# Patient Record
Sex: MALE | Race: WHITE | Employment: FULL TIME
[De-identification: names, ages, dates, MRNs, and addresses within clinical notes are randomized per-mention and may not be internally consistent; named-entity substitution may affect disease eponyms.]

---

## 2017-01-03 ENCOUNTER — SURGERY (OUTPATIENT)
Age: 57
End: 2017-01-03

## 2017-01-03 ENCOUNTER — ANESTHESIA EVENT (OUTPATIENT)
Dept: ENDOSCOPY | Age: 57
End: 2017-01-03
Payer: COMMERCIAL

## 2017-01-03 ENCOUNTER — ANESTHESIA (OUTPATIENT)
Dept: ENDOSCOPY | Age: 57
End: 2017-01-03
Payer: COMMERCIAL

## 2017-01-03 PROCEDURE — 74011250636 HC RX REV CODE- 250/636

## 2017-01-03 PROCEDURE — 74011000250 HC RX REV CODE- 250

## 2017-01-03 RX ORDER — LIDOCAINE HYDROCHLORIDE 20 MG/ML
INJECTION, SOLUTION EPIDURAL; INFILTRATION; INTRACAUDAL; PERINEURAL AS NEEDED
Status: DISCONTINUED | OUTPATIENT
Start: 2017-01-03 | End: 2017-01-03 | Stop reason: HOSPADM

## 2017-01-03 RX ORDER — PROPOFOL 10 MG/ML
INJECTION, EMULSION INTRAVENOUS AS NEEDED
Status: DISCONTINUED | OUTPATIENT
Start: 2017-01-03 | End: 2017-01-03 | Stop reason: HOSPADM

## 2017-01-03 RX ADMIN — PROPOFOL 200 MG: 10 INJECTION, EMULSION INTRAVENOUS at 09:46

## 2017-01-03 RX ADMIN — LIDOCAINE HYDROCHLORIDE 100 MG: 20 INJECTION, SOLUTION EPIDURAL; INFILTRATION; INTRACAUDAL; PERINEURAL at 09:40

## 2017-01-03 NOTE — ANESTHESIA PREPROCEDURE EVALUATION
Anesthetic History   No history of anesthetic complications            Review of Systems / Medical History  Patient summary reviewed, nursing notes reviewed and pertinent labs reviewed    Pulmonary          Smoker (3/4 ppd)         Neuro/Psych   Within defined limits           Cardiovascular    Hypertension              Exercise tolerance: >4 METS  Comments: No EKG on file   GI/Hepatic/Renal       Hepatitis (Dx 7-2016): type C    Liver disease    Comments: Esophageal varices Endo/Other        Cancer (  rectal CA) and anemia     Other Findings   Comments: H/o ETOH         Physical Exam    Airway  Mallampati: I  TM Distance: 4 - 6 cm  Neck ROM: normal range of motion   Mouth opening: Normal     Cardiovascular  Regular rate and rhythm,  S1 and S2 normal,  no murmur, click, rub, or gallop  Rhythm: regular  Rate: normal      Pertinent negatives: No murmur   Dental         Pulmonary  Breath sounds clear to auscultation               Abdominal  GI exam deferred       Other Findings            Anesthetic Plan    ASA: 2  Anesthesia type: general          Induction: Intravenous  Anesthetic plan and risks discussed with: Patient

## 2017-01-03 NOTE — ANESTHESIA POSTPROCEDURE EVALUATION
Post-Anesthesia Evaluation and Assessment    Patient: Kate Gaxiola MRN: [id number]  SSN: [id number]    YOB: 1960  Age: 55 y.o. Sex: male       Cardiovascular Function/Vital Signs  Visit Vitals    /79    Pulse 68    Temp 36.6 °C (97.8 °F)    Resp 15    Ht 5' 10\" (1.778 m)    Wt 93.9 kg (207 lb)    SpO2 99%    BMI 29.7 kg/m2       Patient is status post total IV anesthesia anesthesia for Procedure(s):  ESOPHAGOGASTRODUODENOSCOPY (EGD). Nausea/Vomiting: None    Postoperative hydration reviewed and adequate. Pain:  Pain Scale 1: Numeric (0 - 10) (01/03/17 1014)  Pain Intensity 1: 0 (01/03/17 1014)   Managed    Neurological Status: At baseline    Mental Status and Level of Consciousness: Arousable    Pulmonary Status:   O2 Device: Room air (01/03/17 1014)   Adequate oxygenation and airway patent    Complications related to anesthesia: None    Post-anesthesia assessment completed.  No concerns    Signed By: Anand Vogt DO     January 3, 2017

## 2017-01-13 ENCOUNTER — OFFICE VISIT (OUTPATIENT)
Dept: HEMATOLOGY | Age: 57
End: 2017-01-13

## 2017-01-13 VITALS
HEART RATE: 82 BPM | WEIGHT: 206 LBS | DIASTOLIC BLOOD PRESSURE: 83 MMHG | SYSTOLIC BLOOD PRESSURE: 122 MMHG | BODY MASS INDEX: 29.49 KG/M2 | TEMPERATURE: 96.5 F | OXYGEN SATURATION: 99 % | HEIGHT: 70 IN

## 2017-01-13 DIAGNOSIS — B18.2 CHRONIC HEPATITIS C WITHOUT HEPATIC COMA (HCC): Primary | ICD-10-CM

## 2017-01-13 NOTE — MR AVS SNAPSHOT
Visit Information Date & Time Provider Department Dept. Phone Encounter #  
 1/13/2017 12:30 PM Shun Garcia MD [facility] [address] [phone] [id number] Follow-up Instructions Return for FS with NP. Upcoming Health Maintenance Date Due Pneumococcal 19-64 Highest Risk (1 of 3 - PCV13) 11/10/1980 DTaP/Tdap/Td series (1 - Tdap) 11/10/1982 FOBT Q 1 YEAR AGE 50-75 11/10/2011 INFLUENZA AGE 9 TO ADULT 8/1/2016 Allergies as of 1/13/2017  Review Complete On: 1/13/2017 By: Shun Garcia MD  
 No Known Allergies Current Immunizations  Reviewed on 8/9/2010 No immunizations on file. Not reviewed this visit You Were Diagnosed With   
  
 Codes Comments Chronic hepatitis C without hepatic coma (HCC)    -  Primary ICD-10-CM: B18.2 ICD-9-CM: 070.54 Vitals BP Pulse Temp Height(growth percentile) Weight(growth percentile) SpO2  
 122/83 82 96.5 °F (35.8 °C) (Oral) 5' 10\" (1.778 m) 206 lb (93.4 kg) 99% BMI Smoking Status 29.56 kg/m2 Current Every Day Smoker Vitals History BMI and BSA Data Body Mass Index Body Surface Area  
 29.56 kg/m 2 2.15 m 2 Preferred Pharmacy Pharmacy Name Phone [address] [facility] [phone] Your Updated Medication List  
  
   
This list is accurate as of: 1/13/17  1:29 PM.  Always use your most recent med list.  
  
  
  
  
 ALPRAZolam 0.25 mg tablet Commonly known as:  Helen Curio Take 0.25 mg by mouth two (2) times daily as needed for Anxiety. amLODIPine 5 mg tablet Commonly known as:  Radha Darting Take 5 mg by mouth daily. lisinopril 40 mg tablet Commonly known as:  Marilynne Shows Take 40 mg by mouth daily. multivitamin tablet Commonly known as:  ONE A DAY Take 1 Tab by mouth daily. omeprazole 20 mg capsule Commonly known as:  PRILOSEC  
 20 mg twice a day x 14 days then daily x 6 months  
  
 thiamine 100 mg tablet Commonly known as:  B-1 Take 1 Tab by mouth daily. We Performed the Following AFP WITH AFP-L3% [[id number] Custom] CBC WITH AUTOMATED DIFF [65202 CPT(R)] FERRITIN [08336 CPT(R)] HCV RNA BY COLLETTE QL,RFLX TO QT [73679 CPT(R)] HEP A AB, TOTAL F5216318 CPT(R)] HEP B SURFACE AB [id number] CPT(R)] HEP C GENOTYPE D6085544 CPT(R)] HEPATIC FUNCTION PANEL [15736 CPT(R)] HEPATITIS B CORE AB, TOTAL [id number] CPT(R)] IRON PROFILE V6884899 CPT(R)] METABOLIC PANEL, BASIC [72537 CPT(R)] PROTHROMBIN TIME + INR [28433 CPT(R)] Follow-up Instructions Return for FS with NP. To-Do List   
 01/13/2017 Imaging:  US ABD COMP Introducing [facility]! Dear Caridad Gonzales: Thank you for requesting a [employer] account. Our records indicate that you already have an active StarCard account. You can access your account anytime at [email or web address] Did you know that you can access your hospital and ER discharge instructions at any time in StarCard? You can also review all of your test results from your hospital stay or ER visit. Additional Information If you have questions, please visit the Frequently Asked Questions section of the StarCard website at [email or web address] [employer]/. Remember, StarCard is NOT to be used for urgent needs. For medical emergencies, dial 911. Now available from your iPhone and Android! Please provide this summary of care documentation to your next provider. Your primary care clinician is listed as Omer Hood. Makenzie Magana. If you have any questions after today's visit, please call [phone].

## 2017-01-13 NOTE — PROGRESS NOTES
[address] [facility] MD Christi, MAMIE Painting PA-C Valentine Dies, MD, FACP, MD Simran Hensleyo, NP     Sven Sánchez NP        [address]     [address] [place] [address], [address] [address]     [phone]     FAX: [address] [place] [address], [address] [place] [address], [address] [phone]     FAX: [phone]       Patient Care Team:  Isidro Hughes DO as PCP - General (Family Practice)  Eladia Shanks MD (Gastroenterology)      Problem List  Date Reviewed: 1/13/2017          Codes Class Noted    Secondary esophageal varices with bleeding [facility]) ICD-10-CM: I85.11  ICD-9-CM: 456.20  7/12/2016        Tobacco abuse ICD-10-CM: Z72.0  ICD-9-CM: 305.1  7/12/2016        Hypertension ICD-10-CM: I10  ICD-9-CM: 401.9  7/12/2016        Anal cancer ([facility] ICD-10-CM: C21.0  ICD-9-CM: 154.3  8/11/2010    Overview Signed 4/26/2012  4:00 PM by Pablo Clements MD     5-2010--xrt and chemotx--Dr. Palmer De La Rosa             Essential hypertension, benign ICD-10-CM: I10  ICD-9-CM: 401.1  7/6/2010        Cigarette smoker ICD-10-CM: C79.397  ICD-9-CM: 305.1  7/6/2010        Chronic hepatitis C without hepatic coma [facility]) ICD-10-CM: B18.2  ICD-9-CM: 070.54  7/6/2010                The physicians listed above have asked me to see Eric Mercado in consultation regarding chronic HCV and its management. All medical records sent by the referring physicians were reviewed including imaging studies     The patient is a 55 y.o.  male who found out he had HCV when he developed variceal bleeding with melena in 7/2016. Risk factors for acquiring HCV are inhaling cocaine in 1980s. There was no history of acute incteric hepatitis at the time of these risk factors. Ultrasound of the liver was performed in 7/2016.   The results of the imaging suggested chronic liver disease. An assessment of liver fibrosis with biopsy or elastography has not been performed. The patient has never received treatment for chronic HCV. The most recent laboratory studies indicate that the liver transaminases are elevated, alkaline phosphatase is normal, tests of hepatic synthetic and metabolic function are normal, and the platelet count is depressed. The patient has not developed ascites. The patient has not developed edema. The patient has not developed hepatic encephalopathy. The patient has had prior variceal bleeding. Varices have been treated with band ligation. The patient has no symptoms which can be attributed to the liver disorder. The patient has not experienced fatigue, pain in the right side over the liver, problems concentrating, swelling of the abdomen, swelling of the lower extremities, hematemesis, hematochezia. The patient completes all daily activities without any functional limitations. ALLERGIES  No Known Allergies    MEDICATIONS  Current Outpatient Prescriptions   Medication Sig    amLODIPine (NORVASC) 5 mg tablet Take 5 mg by mouth daily.  lisinopril (PRINIVIL, ZESTRIL) 40 mg tablet Take 40 mg by mouth daily.  omeprazole (PRILOSEC) 20 mg capsule 20 mg twice a day x 14 days then daily x 6 months    thiamine (B-1) 100 mg tablet Take 1 Tab by mouth daily.  ALPRAZolam (XANAX) 0.25 mg tablet Take 0.25 mg by mouth two (2) times daily as needed for Anxiety.  multivitamin (ONE A DAY) tablet Take 1 Tab by mouth daily. No current facility-administered medications for this visit. SYSTEM REVIEW NOT RELATED TO LIVER DISEASE OR REVIEWED ABOVE:  Constitution systems: Negative for fever, chills, weight gain, weight loss. Eyes: Negative for visual changes. ENT: Negative for sore throat, painful swallowing. Respiratory: Negative for cough, hemoptysis, SOB.    Cardiology: Negative for chest pain, palpitations. GI:  Negative for constipation or diarrhea. : Negative for urinary frequency, dysuria, hematuria, nocturia. Skin: Negative for rash. Hematology: Negative for easy bruising, blood clots. Musculo-skelatal: Negative for back pain, muscle pain, weakness. Neurologic: Negative for headaches, dizziness, vertigo, memory problems not related to HE. Psychology: Negative for anxiety, depression. FAMILY HISTORY:  The father  of MI. The mother is alive and healthy. There is no family history of liver disease. SOCIAL HISTORY:  The patient is . The spouse has been tested for HCV and is negative. The patient has 1 child and 1 grandchildren. The patient currently smokes half pack of tobacco daily. The patient has previously consumed alcohol socially never in excess. The patient has been abstinent from alcohol since 2016. The patient currently works full time as a .        PHYSICAL EXAMINATION:  Visit Vitals    /83    Pulse 82    Temp 96.5 °F (35.8 °C) (Oral)    Ht 5' 10\" (1.778 m)    Wt 206 lb (93.4 kg)    SpO2 99%    BMI 29.56 kg/m2     General: No acute distress. Eyes: Sclera anicteric. ENT: No oral lesions. Thyroid normal.  Nodes: No adenopathy. Skin: No spider angiomata. No jaundice. No palmar erythema. Respiratory: Lungs clear to auscultation. Cardiovascular: Regular heart rate. No murmurs. No JVD. Abdomen: Soft non-tender. Liver size normal to percussion/palpation. Spleen not palpable. No obvious ascites. Extremities: No edema. No muscle wasting. No gross arthritic changes. Neurologic: Alert and oriented. Cranial nerves grossly intact. No asterixis.     LABORATORY STUDIES:  [facility] [place] [facility] [address] [place] [address] & Units 2017   WBC 3.4 - 10.8 x10E3/uL 6.2 4.5   ANC 1.4 - 7.0 x10E3/uL 3.2 2.4   HGB 12.6 - 17.7 g/dL 12.3 (L) 8.7 (L)    - 379 x10E3/uL 140 (L) 69 (L)   INR 0.8 - 1. 2 1.1    AST 0 - 40 IU/L 78 (H)    ALT 0 - 44 IU/L 51 (H)    Alk Phos 39 - 117 IU/L 111    Bili, Total 0.0 - 1.2 mg/dL 0.5    Bili, Direct 0.00 - 0.40 mg/dL 0.24    Albumin 3.5 - 5.5 g/dL 4.2    BUN 6 - 24 mg/dL 17 13   Creat 0.76 - 1.27 mg/dL 0.76 0.85   Na 134 - 144 mmol/L 143 142   K 3.5 - 5.2 mmol/L 4.6 3.6   Cl 96 - 106 mmol/L 102 111 (H)   CO2 18 - 29 mmol/L 23 20 (L)   Glucose 65 - 99 mg/dL 93 108 (H)   Magnesium 1.6 - 2.4 mg/dL  1.8     SEROLOGIES:  Serologies Latest Ref Rng & Units 1/13/2017 7/12/2016   Hep A Ab, Total Negative Positive (A)    Hep B Surface Ag Index  <0.10   Hep B Surface Ag Interp NEG    NEGATIVE   Hep B Core Ab, Total Negative Negative    Hep B Surface AB QL  Non Reactive    Hep C Ab NR    REACTIVE (A)   Ferritin 30 - 400 ng/mL 20 (L) 190   Iron % Saturation 15 - 55 % 7 (LL) 21     LIVER HISTOLOGY:  Not available or performed    ENDOSCOPIC PROCEDURES:  7/2016. EGD by Dr Evangelina Mcclellan. Esophagela varices. Banding performed. 11/2016. EGD by Dr Evangelina Mcclellan. Esophagela varices. Banding performed. 1/2017. EGD by Dr Evangelina Mcclellan. Scars of prior banding. No varices. RADIOLOGY:  7/2016. Ultrasound of liver. Echogenic consistent with chronic liver disease. No liver mass lesions. No dilated bile ducts. No ascites. OTHER TESTING:  Not available or performed    ASSESSMENT AND PLAN:  Chronic HCV with cirrhosis. Have performed laboratory testing to monitor liver function and degree of liver injury. This included BMP, hepatic panel, CBC with platelet count, INR. Liver function is normal.  The platelet count is depressed. Will perform and/or review results of HCV viral load and HCV genotype to define the specific treatment and duration of treatment that will be required. Will perform serologic and virologic studies to assess for other causes of chronic liver disease. There is no reason to perform liver biopsy at this time.       The Fibroscan can assess liver fibrosis and determine if a patient has advanced fibrosis or cirrhosis without the need for liver biopsy. The Fibroscan is currently available at [facility]. This will be performed at the next office visit. The patient has not previously been treated for HCV. Discussed the treatment alternatives. The SVR/cure rate for HCV now exceeds 90% with just oral anti-viral therapy and no interferon injections or significant side effects for most patients with HCV. The specific treatment is dependent upon genotype, viral load and histology. The patient was directed to continue all current medications at the current dosages. There are no contraindications for the patient to take any medications that are necessary for treatment of other medical issues. The patient was counseled regarding alcohol consumption. Vaccination for viral hepatitis B is recommended since the patient has no serologic evidence of previous exposure or vaccination with immunity. Vaccination for viral hepatitis A is not needed. The patient has serologic evidence of prior exposure or vaccination with immunity. [address] screening will be performed prior to the next office visit. AFP was ordered today and ultrasound will be scheduled. 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 No treatment necessary. Anemia is secondary to low iron stores. The ferritin and iron saturation are now. Will start oral FE for 3 months and monitor HB and repeat FE panel and ferritin in 3 months. Esophageal varicies with prior bleeding. Varices have been banded to obliteration. The next EGD to assess for varices will be performed in 1/2018. All of the above issues were discussed with the patient. All questions were answered. The patient expressed a clear understanding of the above. [facility] 87 in 4 weeks for Fibroscan, and to initiate HCV treatment.     Lakshmi Robertson MD  [facility] [place] [facility] [address]  [phone]

## 2017-01-14 LAB
ALBUMIN SERPL-MCNC: 4.2 G/DL (ref 3.5–5.5)
ALP SERPL-CCNC: 111 IU/L (ref 39–117)
ALT SERPL-CCNC: 51 IU/L (ref 0–44)
AST SERPL-CCNC: 78 IU/L (ref 0–40)
BASOPHILS # BLD AUTO: 0.1 X10E3/UL (ref 0–0.2)
BASOPHILS NFR BLD AUTO: 1 %
BILIRUB DIRECT SERPL-MCNC: 0.24 MG/DL (ref 0–0.4)
BILIRUB SERPL-MCNC: 0.5 MG/DL (ref 0–1.2)
BUN SERPL-MCNC: 17 MG/DL (ref 6–24)
BUN/CREAT SERPL: 22 (ref 9–20)
CALCIUM SERPL-MCNC: 9.3 MG/DL (ref 8.7–10.2)
CHLORIDE SERPL-SCNC: 102 MMOL/L (ref 96–106)
CO2 SERPL-SCNC: 23 MMOL/L (ref 18–29)
CREAT SERPL-MCNC: 0.76 MG/DL (ref 0.76–1.27)
EOSINOPHIL # BLD AUTO: 0.2 X10E3/UL (ref 0–0.4)
EOSINOPHIL NFR BLD AUTO: 2 %
ERYTHROCYTE [DISTWIDTH] IN BLOOD BY AUTOMATED COUNT: 15.3 % (ref 12.3–15.4)
FERRITIN SERPL-MCNC: 20 NG/ML (ref 30–400)
GLUCOSE SERPL-MCNC: 93 MG/DL (ref 65–99)
HAV AB SER QL IA: POSITIVE
HBV CORE AB SERPL QL IA: NEGATIVE
HBV SURFACE AB SER QL: NON REACTIVE
HCT VFR BLD AUTO: 36.6 % (ref 37.5–51)
HGB BLD-MCNC: 12.3 G/DL (ref 12.6–17.7)
IMM GRANULOCYTES # BLD: 0 X10E3/UL (ref 0–0.1)
IMM GRANULOCYTES NFR BLD: 0 %
INR PPP: 1.1 (ref 0.8–1.2)
IRON SATN MFR SERPL: 7 % (ref 15–55)
IRON SERPL-MCNC: 36 UG/DL (ref 38–169)
LYMPHOCYTES # BLD AUTO: 2.1 X10E3/UL (ref 0.7–3.1)
LYMPHOCYTES NFR BLD AUTO: 34 %
MCH RBC QN AUTO: 27.7 PG (ref 26.6–33)
MCHC RBC AUTO-ENTMCNC: 33.6 G/DL (ref 31.5–35.7)
MCV RBC AUTO: 82 FL (ref 79–97)
MONOCYTES # BLD AUTO: 0.7 X10E3/UL (ref 0.1–0.9)
MONOCYTES NFR BLD AUTO: 10 %
NEUTROPHILS # BLD AUTO: 3.2 X10E3/UL (ref 1.4–7)
NEUTROPHILS NFR BLD AUTO: 53 %
PLATELET # BLD AUTO: 140 X10E3/UL (ref 150–379)
POTASSIUM SERPL-SCNC: 4.6 MMOL/L (ref 3.5–5.2)
PROT SERPL-MCNC: 8 G/DL (ref 6–8.5)
PROTHROMBIN TIME: 11.5 SEC (ref 9.1–12)
RBC # BLD AUTO: 4.44 X10E6/UL (ref 4.14–5.8)
SODIUM SERPL-SCNC: 143 MMOL/L (ref 134–144)
TIBC SERPL-MCNC: 530 UG/DL (ref 250–450)
UIBC SERPL-MCNC: 494 UG/DL (ref 111–343)
WBC # BLD AUTO: 6.2 X10E3/UL (ref 3.4–10.8)

## 2017-01-15 RX ORDER — LANOLIN ALCOHOL/MO/W.PET/CERES
325 CREAM (GRAM) TOPICAL
Qty: 270 TAB | Refills: 3 | Status: SHIPPED | OUTPATIENT
Start: 2017-01-15 | End: 2017-01-24 | Stop reason: SDUPTHER

## 2017-01-16 LAB
AFP L3 MFR SERPL: 7.8 % (ref 0–9.9)
AFP SERPL-MCNC: 11.3 NG/ML (ref 0–8)
HCV RNA SERPL NAA+PROBE-ACNC: NORMAL IU/ML
HCV RNA SERPL NAA+PROBE-LOG IU: 6.24 LOG10 IU/ML
HCV RNA SERPL QL NAA+PROBE: POSITIVE
TEST INFORMATION: NORMAL

## 2017-01-18 LAB
HCV GENTYP SERPL NAA+PROBE: NORMAL
PLEASE NOTE, 550474: NORMAL

## 2017-01-24 DIAGNOSIS — D50.9 IRON DEFICIENCY ANEMIA, UNSPECIFIED IRON DEFICIENCY ANEMIA TYPE: Primary | ICD-10-CM

## 2017-01-24 RX ORDER — LANOLIN ALCOHOL/MO/W.PET/CERES
325 CREAM (GRAM) TOPICAL
Qty: 270 TAB | Refills: 3 | Status: SHIPPED | OUTPATIENT
Start: 2017-01-24 | End: 2017-09-06 | Stop reason: ALTCHOICE

## 2017-02-11 ENCOUNTER — HOSPITAL ENCOUNTER (OUTPATIENT)
Dept: ULTRASOUND IMAGING | Age: 57
Discharge: HOME | End: 2017-02-11
Attending: INTERNAL MEDICINE
Payer: COMMERCIAL

## 2017-02-11 DIAGNOSIS — B18.2 CHRONIC HEPATITIS C WITHOUT HEPATIC COMA (HCC): ICD-10-CM

## 2017-02-11 PROCEDURE — 76700 US EXAM ABDOM COMPLETE: CPT

## 2017-02-22 ENCOUNTER — OFFICE VISIT (OUTPATIENT)
Dept: HEMATOLOGY | Age: 57
End: 2017-02-22

## 2017-02-22 VITALS
WEIGHT: 212.13 LBS | TEMPERATURE: 98.9 F | HEIGHT: 70 IN | SYSTOLIC BLOOD PRESSURE: 129 MMHG | DIASTOLIC BLOOD PRESSURE: 81 MMHG | HEART RATE: 86 BPM | OXYGEN SATURATION: 97 % | BODY MASS INDEX: 30.37 KG/M2

## 2017-02-22 DIAGNOSIS — B18.2 CHRONIC HEPATITIS C WITHOUT HEPATIC COMA (HCC): Primary | ICD-10-CM

## 2017-02-22 RX ORDER — LEDIPASVIR AND SOFOSBUVIR 90; 400 MG/1; MG/1
1 TABLET, FILM COATED ORAL DAILY
Qty: 28 TAB | Refills: 2 | Status: SHIPPED | OUTPATIENT
Start: 2017-02-22 | End: 2017-05-17

## 2017-02-22 NOTE — PROGRESS NOTES
93 Dakota Barraza MD, Lisa Del Valle, TERE Grimes MD, FACP, MD Simran Garcia, MAMIE Christian NP        [address], [address] [facility], [address]     [phone]     FAX: 104 [address], [address] [place] [address], [address]     [phone]     FAX: [phone]       Patient Care Team:  Salvatore Fang DO as PCP - General (Family Practice)  Ignacio Quarles MD (Gastroenterology)      Problem List  Date Reviewed: 2/22/2017          Codes Class Noted    Secondary esophageal varices with bleeding [facility]) ICD-10-CM: I85.11  ICD-9-CM: 456.20  7/12/2016        Tobacco abuse ICD-10-CM: Z72.0  ICD-9-CM: 305.1  7/12/2016        Hypertension ICD-10-CM: I10  ICD-9-CM: 401.9  7/12/2016        Anal cancer ([facility] [address] ICD-10-CM: C21.0  ICD-9-CM: 154.3  8/11/2010    Overview Signed 4/26/2012  4:00 PM by Rosy Blackmon MD     5-2010--xrt and chemotx--Dr. Talia Srinivasan and García             Essential hypertension, benign ICD-10-CM: I10  ICD-9-CM: 401.1  7/6/2010        Cigarette smoker ICD-10-CM: V63.432  ICD-9-CM: 305.1  7/6/2010        Chronic hepatitis C without hepatic coma [facility]) ICD-10-CM: B18.2  ICD-9-CM: 070.54  7/6/2010              Michaela Grajeda returns to the [place] [address] today for education and management of chronic hepatitis C and to perform an in-office assessment of hepatic fibrosis by means of fibroscan analysis. The active problem list, all pertinent past medical history, medications, liver histology, endoscopic studies, radiologic findings and laboratory findings related to the liver disorder were reviewed with the patient. The patient is a 55 y.o.  male who found out he had HCV when he developed variceal bleeding with melena in 7/2016.       Risk factors for acquiring HCV are inhaling cocaine in 1980s. There was no history of acute incteric hepatitis at the time of these risk factors. Ultrasound of the liver was performed in 02/2017. The results of the imaging suggested chronic liver disease. No hepatic masses. An assessment of liver fibrosis with fibroscan analysis was performed during this visit. The results suggest bridging fibrosis/cirrhosis. The wide IQR and patient presentation also suggest a component of fatty liver. The patient has never received treatment for chronic HCV. The most recent laboratory studies indicate that the liver transaminases are elevated, alkaline phosphatase is normal, tests of hepatic synthetic and metabolic function are normal, and the platelet count is depressed. The patient has not developed ascites. The patient has not developed edema. The patient has not developed hepatic encephalopathy. The patient has had prior variceal bleeding. Varices have been treated with band ligation. The patient has no symptoms which can be attributed to the liver disorder. The patient completes all daily activities without any functional limitations. The patient has not experienced fatigue, fevers, chills, shortness of breath, chest pain, pain in the right side over the liver, diffuse abdominal pain, nausea, vomiting, constipation, diarrhrea, dry eyes, dry mouth, arthralgias, myalgias, yellowing of the eyes or skin, itching, dark urine, problems concentrating, swelling of the abdomen, swelling of the lower extremities, hematemesis, or hematochezia. ALLERGIES  No Known Allergies    MEDICATIONS  Current Outpatient Prescriptions   Medication Sig    ledipasvir-sofosbuvir (HARVONI)  mg tab Take 1 Tab by mouth daily for 84 days. Indications: CHRONIC HEPATITIS C - GENOTYPE 1, GT 1A. Cirrhotic. Txmt naive. [facility] pt.     ferrous sulfate 325 mg (65 mg iron) tablet Take 1 Tab by mouth three (3) times daily (with meals).  amLODIPine (NORVASC) 5 mg tablet Take 5 mg by mouth daily.  lisinopril (PRINIVIL, ZESTRIL) 40 mg tablet Take 40 mg by mouth daily.  omeprazole (PRILOSEC) 20 mg capsule 20 mg twice a day x 14 days then daily x 6 months    thiamine (B-1) 100 mg tablet Take 1 Tab by mouth daily.  multivitamin (ONE A DAY) tablet Take 1 Tab by mouth daily. No current facility-administered medications for this visit. SYSTEM REVIEW NOT RELATED TO LIVER DISEASE OR REVIEWED ABOVE:  Constitution systems: Negative for fever, chills, weight gain, weight loss. Eyes: Negative for visual changes. ENT: Negative for sore throat, painful swallowing. Respiratory: Negative for cough, hemoptysis, SOB. Cardiology: Negative for chest pain, palpitations. GI:  Negative for constipation or diarrhea. : Negative for urinary frequency, dysuria, hematuria, nocturia. Skin: Negative for rash. Hematology: Negative for easy bruising, blood clots. Musculo-skelatal: Negative for back pain, muscle pain, weakness. Neurologic: Negative for headaches, dizziness, vertigo, memory problems not related to HE. Psychology: Negative for anxiety, depression. FAMILY HISTORY:  The father  of MI. The mother is alive and healthy. There is no family history of liver disease. SOCIAL HISTORY:  The patient is . The spouse has been tested for HCV and is negative. The patient has 1 child and 1 grandchildren. The patient currently smokes half pack of tobacco daily. The patient has previously consumed alcohol socially never in excess. The patient has been abstinent from alcohol since 2016. The patient currently works full time as a .        PHYSICAL EXAMINATION:  Visit Vitals    /81    Pulse 86    Temp 98.9 °F (37.2 °C) (Tympanic)    Ht 5' 10\" (1.778 m)    Wt 212 lb 2 oz (96.2 kg)    SpO2 97%    BMI 30.44 kg/m2     General: No acute distress. Eyes: Sclera anicteric. ENT: No oral lesions. Thyroid normal.  Nodes: No adenopathy. Skin: No spider angiomata. No jaundice. No palmar erythema. Respiratory: Lungs clear to auscultation. Cardiovascular: Regular heart rate. No murmurs. No JVD. Abdomen: Soft non-tender. Liver size normal to percussion/palpation. Spleen not palpable. No obvious ascites. Extremities: No edema. No muscle wasting. No gross arthritic changes. Neurologic: Alert and oriented. Cranial nerves grossly intact. No asterixis. LABORATORY STUDIES:  Liver [place] [address] & Units 1/13/2017 7/14/2016   WBC 3.4 - 10.8 x10E3/uL 6.2 4.5   ANC 1.4 - 7.0 x10E3/uL 3.2 2.4   HGB 12.6 - 17.7 g/dL 12.3 (L) 8.7 (L)    - 379 x10E3/uL 140 (L) 69 (L)   INR 0.8 - 1.2 1.1    AST 0 - 40 IU/L 78 (H)    ALT 0 - 44 IU/L 51 (H)    Alk Phos 39 - 117 IU/L 111    Bili, Total 0.0 - 1.2 mg/dL 0.5    Bili, Direct 0.00 - 0.40 mg/dL 0.24    Albumin 3.5 - 5.5 g/dL 4.2    BUN 6 - 24 mg/dL 17 13   Creat 0.76 - 1.27 mg/dL 0.76 0.85   Na 134 - 144 mmol/L 143 142   K 3.5 - 5.2 mmol/L 4.6 3.6   Cl 96 - 106 mmol/L 102 111 (H)   CO2 18 - 29 mmol/L 23 20 (L)   Glucose 65 - 99 mg/dL 93 108 (H)   Magnesium 1.6 - 2.4 mg/dL  1.8     SEROLOGIES:  Serologies Latest Ref Rng & Units 1/13/2017 7/12/2016   Hep A Ab, Total Negative Positive (A)    Hep B Surface Ag Index  <0.10   Hep B Surface Ag Interp NEG    NEGATIVE   Hep B Core Ab, Total Negative Negative    Hep B Surface AB QL  Non Reactive    Hep C Ab NR    REACTIVE (A)   Ferritin 30 - 400 ng/mL 20 (L) 190   Iron % Saturation 15 - 55 % 7 (LL) 21     LIVER HISTOLOGY:  02/2017. FibroScan performed at [facility] of [address] [place] [address]. EkPa was 13.8. Suggested fibrosis level is F3/F4. The wide IQR of 2.2 also suggests fatty liver. ENDOSCOPIC PROCEDURES:  7/2016. EGD by Dr Rosa Tamez. Esophagela varices. Banding performed. 11/2016. EGD by Dr Rosa Tamez. Esophagela varices. Banding performed. 1/2017. EGD by Dr Rosa Tamez. Scars of prior banding. No varices. RADIOLOGY:  7/2016. Ultrasound of liver. Echogenic consistent with chronic liver disease. No liver mass lesions. No dilated bile ducts. No ascites. 02/2017. Ultrasound of the liver. Mildly echogenic heterogeneous liver with no focal abnormality detected. OTHER TESTING:  Not available or performed    ASSESSMENT AND PLAN:  Chronic HCV with cirrhosis. The most recent laboratory studies indicate that the liver transaminases are elevated, alkaline phosphatase is normal, tests of hepatic synthetic and metabolic function are normal, and the platelet count is depressed. Fibroscan analysis suggests advanced liver disease, bridging fibrosis/cirrhosis. Complications of cirrhosis were discussed in detail. We discussed thrombocytopenia, portal hypertension, varices, GI bleeding, peripheral edema, ascites, hepatic encephalopathy, and hepatocellular carcinoma. We discussed the need for follow ups on a regular basis, at 3 month intervals to monitor for complications. We discussed the need for every 6 month liver imaging studies. Serologic evaluation was negative for all causes of chronic liver disease. HCV. The patient has genotype 1A and is treatment naive. We discussed treatment options. One treatment regime is Harvoni, a combination pill of 400 mg sofosbuvir and 90 mg ledipasvir. The treatment regime is one tablet daily for 12 weeks. He would like to be treated with this regime. This was ordered through [facility] in [place]    I explained to him that I ordered the HCV treatment medications through our specialty pharmacy and that the medications will be shipped to his home. He will receive 3 separate shipments of 28 tabs per shipment. He may begin taking the treatment medications as soon as they arrive. He is to call and make an appointment for treatment week #4 once he begins therapy. He voiced understanding of this plan.      The patient was directed to continue all current medications at the current dosages. There are no contraindications for the patient to take any medications that are necessary for treatment of other medical issues. The patient currently take omeprazole 20 mg/day. He states that he will stop this medication next week. I explained that it is fine to take the omeprazole as long as he either takes it with the Harvoni or 12 hours apart from it and does not increase the dose of the PPI above 20 mg/day. The patient was instructed not to take any antacids within 4 hours of taking the Harvoni. The patient verbalized understanding of these instructions. The patient was counseled regarding alcohol consumption. Vaccination for viral hepatitis B is recommended since the patient has no serologic evidence of previous exposure or vaccination with immunity. Vaccination for viral hepatitis A is not needed. The patient has serologic evidence of prior exposure or vaccination with immunity. [address] screening will be performed prior to the next office visit. AFP was ordered today and ultrasound will be scheduled. 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 No treatment necessary. Anemia is secondary to low iron stores. The ferritin and iron saturation are now. Will started oral FE for 3 months and will monitor HB and repeat FE panel and ferritin in 3 months. Esophageal varicies with prior bleeding. Varices have been banded to obliteration. The next EGD to assess for varices will be performed in 1/2018. All of the above issues were discussed with the patient. All questions were answered. The patient expressed a clear understanding of the above. 30 minutes total time spent with this patient with more than 50% of this time spent counseling and coordinating care as described above. Kelin in 3 months.   He will make a treatment week 4 appointment if he begins HCV treatment before then.       Kat Dey, ANNAMARIAP-C   Liver [facility]/ [facility], [address] [facility], [address] [facility]  [phone]

## 2017-04-19 ENCOUNTER — OFFICE VISIT (OUTPATIENT)
Dept: HEMATOLOGY | Age: 57
End: 2017-04-19

## 2017-04-19 VITALS
BODY MASS INDEX: 30.96 KG/M2 | HEART RATE: 80 BPM | DIASTOLIC BLOOD PRESSURE: 80 MMHG | OXYGEN SATURATION: 97 % | SYSTOLIC BLOOD PRESSURE: 124 MMHG | WEIGHT: 215.8 LBS | TEMPERATURE: 98.3 F

## 2017-04-19 DIAGNOSIS — B18.2 CHRONIC HEPATITIS C WITHOUT HEPATIC COMA (HCC): Primary | ICD-10-CM

## 2017-04-19 NOTE — MR AVS SNAPSHOT
Visit Information Date & Time Provider Department Dept. Phone Encounter #  
 4/19/2017  2:00 PM Sarah López NP [facility] [id number] Follow-up Instructions Return in about 20 weeks (around 9/6/2017). Your Appointments 9/6/2017  1:30 PM  
Follow Up with Holly Brock NP [facility] [address] ([facility]) Appt Note: Follow up [address] [facility] [phone] [facility] [address]  
[address] [facility] [address] [address] Upcoming Health Maintenance Date Due Pneumococcal 19-64 Highest Risk (1 of 3 - PCV13) 11/10/1980 DTaP/Tdap/Td series (1 - Tdap) 11/10/1982 FOBT Q 1 YEAR AGE 50-75 11/10/2011 INFLUENZA AGE 9 TO ADULT 8/1/2016 Allergies as of 4/19/2017  Review Complete On: 4/19/2017 By: Alicia Bosch No Known Allergies Current Immunizations  Reviewed on 8/9/2010 No immunizations on file. Not reviewed this visit You Were Diagnosed With   
  
 Codes Comments Chronic hepatitis C without hepatic coma (HCC)    -  Primary ICD-10-CM: B18.2 ICD-9-CM: 070.54 Vitals BP Pulse Temp Weight(growth percentile) SpO2 BMI  
 124/80 80 98.3 °F (36.8 °C) (Tympanic) 215 lb 12.8 oz (97.9 kg) 97% 30.96 kg/m2 Smoking Status Current Every Day Smoker BMI and BSA Data Body Mass Index Body Surface Area 30.96 kg/m 2 2.2 m 2 Preferred Pharmacy Pharmacy Name Phone Cecy Jones @ [address] [phone] Your Updated Medication List  
  
   
This list is accurate as of: 4/19/17  2:27 PM.  Always use your most recent med list. amLODIPine 5 mg tablet Commonly known as:  Sharion [place] Take 5 mg by mouth daily. ferrous sulfate 325 mg (65 mg iron) tablet Take 1 Tab by mouth three (3) times daily (with meals). ledipasvir-sofosbuvir  mg Tab Commonly known as:  Elton Rm Take 1 Tab by mouth daily for 84 days. Indications: CHRONIC HEPATITIS C - GENOTYPE 1, GT 1A. Cirrhotic. Txmt naive. [facility] pt.  
  
 lisinopril 40 mg tablet Commonly known as:  Stephen Masmerrick Take 40 mg by mouth daily. multivitamin tablet Commonly known as:  ONE A DAY Take 1 Tab by mouth daily. omeprazole 20 mg capsule Commonly known as:  PRILOSEC  
20 mg twice a day x 14 days then daily x 6 months  
  
 thiamine 100 mg tablet Commonly known as:  B-1 Take 1 Tab by mouth daily. We Performed the Following AFP WITH AFP-L3% [[id number] Custom] CBC WITH AUTOMATED DIFF [89290 CPT(R)] FERRITIN [14638 CPT(R)] HCV, QT WITH GRAPH [id number] CPT(R)] HEPATIC FUNCTION PANEL [42495 CPT(R)] IRON PROFILE [id number] CPT(R)] METABOLIC PANEL, BASIC [13242 CPT(R)] PROTHROMBIN TIME + INR [98802 CPT(R)] Follow-up Instructions Return in about 20 weeks (around 9/6/2017). Introducing [facility]! Dear Ruth Zuñiga: Thank you for requesting a [employer] account. Our records indicate that you already have an active [employer] account. You can access your account anytime at [email or web address] Did you know that you can access your hospital and ER discharge instructions at any time in [employer]? You can also review all of your test results from your hospital stay or ER visit. Additional Information If you have questions, please visit the Frequently Asked Questions section of the [employer] website at [email or web address] Remember, [employer] is NOT to be used for urgent needs. For medical emergencies, dial 911. Now available from your iPhone and Android! Please provide this summary of care documentation to your next provider. Your primary care clinician is listed as Sierra Montes. Mega Medel.  If you have any questions after today's visit, please call [phone].

## 2017-04-19 NOTE — PROGRESS NOTES
[facility], MAMIE Rosenberg PA-C Clotilde Caprio, MD, FACP, MD Simran Beverly NP Fain Sayers, NP        [address] [place] [address]     [address] [place] [address], [address] [address]     [phone]     FAX: [address], [address] [facility], [address]     [phone]     FAX: [phone]       Patient Care Team:  Macarena Perez DO as PCP - General (Family Practice)  Derick Reyez MD (Gastroenterology)      Problem List  Date Reviewed: 4/19/2017          Codes Class Noted    Secondary esophageal varices with bleeding [facility]) ICD-10-CM: I85.11  ICD-9-CM: 456.20  7/12/2016        Tobacco abuse ICD-10-CM: Z72.0  ICD-9-CM: 305.1  7/12/2016        Hypertension ICD-10-CM: I10  ICD-9-CM: 401.9  7/12/2016        Anal cancer ([facility] ICD-10-CM: C21.0  ICD-9-CM: 154.3  8/11/2010    Overview Signed 4/26/2012  4:00 PM by Kg Duong MD     5-2010--xrt and chemotx--Dr. Lady Robert             Essential hypertension, benign ICD-10-CM: I10  ICD-9-CM: 401.1  7/6/2010        Cigarette smoker ICD-10-CM: Y56.165  ICD-9-CM: 305.1  7/6/2010        Chronic hepatitis C without hepatic coma [facility]) ICD-10-CM: B18.2  ICD-9-CM: 070.54  7/6/2010              Casi Stallworth returns to the [facility] [employer] today for education and management of cirrhosis and chronic hepatitis C. The patient began HCV treatment on 03/18/2017 with once daily Harvoni. He will be treated for 12 weeks total.   The HCV has not been treated previously. The active problem list, all pertinent past medical history, medications, liver histology, endoscopic studies, radiologic findings and laboratory findings related to the liver disorder were reviewed with the patient. The patient is a 55 y.o.   male who found out he had HCV when he developed variceal bleeding with melena in 7/2016. Risk factors for acquiring HCV are inhaling cocaine in 1980s. There was no history of acute incteric hepatitis at the time of these risk factors. Ultrasound of the liver was performed in 02/2017. The results of the imaging suggested chronic liver disease. No hepatic masses. An assessment of liver fibrosis with fibroscan analysis was performed during this visit. The results suggest bridging fibrosis/cirrhosis. The wide IQR and patient presentation also suggest a component of fatty liver. The most recent laboratory studies indicate that the liver transaminases are elevated, alkaline phosphatase is normal, tests of hepatic synthetic and metabolic function are normal, and the platelet count is depressed. The patient has not developed ascites. The patient has not developed edema. The patient has not developed hepatic encephalopathy. The patient has had prior variceal bleeding. Varices have been treated with band ligation. The patient has no symptoms which can be attributed to the liver disorder. The patient completes all daily activities without any functional limitations. The patient has not experienced fatigue, fevers, chills, shortness of breath, chest pain, pain in the right side over the liver, diffuse abdominal pain, nausea, vomiting, constipation, diarrhrea, dry eyes, dry mouth, arthralgias, myalgias, yellowing of the eyes or skin, itching, dark urine, problems concentrating, swelling of the abdomen, swelling of the lower extremities, hematemesis, or hematochezia. ALLERGIES  No Known Allergies    MEDICATIONS  Current Outpatient Prescriptions   Medication Sig    ledipasvir-sofosbuvir (HARVONI)  mg tab Take 1 Tab by mouth daily for 84 days. Indications: CHRONIC HEPATITIS C - GENOTYPE 1, GT 1A. Cirrhotic. Txmt naive. [facility] pt.     ferrous sulfate 325 mg (65 mg iron) tablet Take 1 Tab by mouth three (3) times daily (with meals).  amLODIPine (NORVASC) 5 mg tablet Take 5 mg by mouth daily.  lisinopril (PRINIVIL, ZESTRIL) 40 mg tablet Take 40 mg by mouth daily.  thiamine (B-1) 100 mg tablet Take 1 Tab by mouth daily.  multivitamin (ONE A DAY) tablet Take 1 Tab by mouth daily.  omeprazole (PRILOSEC) 20 mg capsule 20 mg twice a day x 14 days then daily x 6 months     No current facility-administered medications for this visit. SYSTEM REVIEW NOT RELATED TO LIVER DISEASE OR REVIEWED ABOVE:  Constitution systems: Negative for fever, chills, weight gain, weight loss. Eyes: Negative for visual changes. ENT: Negative for sore throat, painful swallowing. Respiratory: Negative for cough, hemoptysis, SOB. Cardiology: Negative for chest pain, palpitations. GI:  Negative for constipation or diarrhea. : Negative for urinary frequency, dysuria, hematuria, nocturia. Skin: Negative for rash. Hematology: Negative for easy bruising, blood clots. Musculo-skelatal: Negative for back pain, muscle pain, weakness. Neurologic: Negative for headaches, dizziness, vertigo, memory problems not related to HE. Psychology: Negative for anxiety, depression. FAMILY HISTORY:  The father  of [place]. The mother is alive and healthy. There is no family history of liver disease. SOCIAL HISTORY:  The patient is . The spouse has been tested for HCV and is negative. The patient has 1 child and 1 grandchildren. The patient currently smokes half pack of tobacco daily. The patient has previously consumed alcohol socially never in excess. The patient has been abstinent from alcohol since 2016. The patient currently works full time as a .        PHYSICAL EXAMINATION:  Visit Vitals    /80    Pulse 80    Temp 98.3 °F (36.8 °C) (Tympanic)    Wt 215 lb 12.8 oz (97.9 kg)    SpO2 97%    BMI 30.96 kg/m2     General: No acute distress. Eyes: Sclera anicteric.    ENT: No oral lesions. Thyroid normal.  Nodes: No adenopathy. Skin: No spider angiomata. No jaundice. No palmar erythema. Respiratory: Lungs clear to auscultation. Cardiovascular: Regular heart rate. No murmurs. No JVD. Abdomen: Soft non-tender. Liver size normal to percussion/palpation. Spleen not palpable. No obvious ascites. Extremities: No edema. No muscle wasting. No gross arthritic changes. Neurologic: Alert and oriented. Cranial nerves grossly intact. No asterixis. LABORATORY STUDIES:  Liver [place] of [address] [place] [address] & Units 1/13/2017 7/14/2016   WBC 3.4 - 10.8 x10E3/uL 6.2 4.5   ANC 1.4 - 7.0 x10E3/uL 3.2 2.4   HGB 12.6 - 17.7 g/dL 12.3 (L) 8.7 (L)    - 379 x10E3/uL 140 (L) 69 (L)   INR 0.8 - 1.2 1.1    AST 0 - 40 IU/L 78 (H)    ALT 0 - 44 IU/L 51 (H)    Alk Phos 39 - 117 IU/L 111    Bili, Total 0.0 - 1.2 mg/dL 0.5    Bili, Direct 0.00 - 0.40 mg/dL 0.24    Albumin 3.5 - 5.5 g/dL 4.2    BUN 6 - 24 mg/dL 17 13   Creat 0.76 - 1.27 mg/dL 0.76 0.85   Na 134 - 144 mmol/L 143 142   K 3.5 - 5.2 mmol/L 4.6 3.6   Cl 96 - 106 mmol/L 102 111 (H)   CO2 18 - 29 mmol/L 23 20 (L)   Glucose 65 - 99 mg/dL 93 108 (H)   Magnesium 1.6 - 2.4 mg/dL  1.8     SEROLOGIES:  Serologies Latest Ref Rng & Units 1/13/2017 7/12/2016   Hep A Ab, Total Negative Positive (A)    Hep B Surface Ag Index  <0.10   Hep B Surface Ag Interp NEG    NEGATIVE   Hep B Core Ab, Total Negative Negative    Hep B Surface AB QL  Non Reactive    Hep C Ab NR    REACTIVE (A)   Ferritin 30 - 400 ng/mL 20 (L) 190   Iron % Saturation 15 - 55 % 7 (LL) 21     LIVER HISTOLOGY:  02/2017. FibroScan performed at [address]. EkPa was 13.8. Suggested fibrosis level is F3/F4. The wide IQR of 2.2 also suggests fatty liver. ENDOSCOPIC PROCEDURES:  7/2016. EGD by Dr Mindy Posey. Esophagela varices. Banding performed. 11/2016. EGD by Dr Mindy Posey. Esophagela varices. Banding performed. 1/2017. EGD by Dr Mindy Posey.   Scars of prior banding. No varices. RADIOLOGY:  7/2016. Ultrasound of liver. Echogenic consistent with chronic liver disease. No liver mass lesions. No dilated bile ducts. No ascites. 02/2017. Ultrasound of the liver. Mildly echogenic heterogeneous liver with no focal abnormality detected. OTHER TESTING:  Not available or performed    ASSESSMENT AND PLAN:  Chronic HCV with cirrhosis. The most recent laboratory studies indicate that the liver transaminases are elevated, alkaline phosphatase is normal, tests of hepatic synthetic and metabolic function are normal, and the platelet count is depressed. Will perform laboratory testing to monitor liver function and degree of liver injury. This will include hepatic panel, a CBC w/ diff, a PT/INR, an AFP-L3%, a BMP, a ferritin and iron panel, and HCV RNA. Complications of cirrhosis were discussed in detail. We discussed thrombocytopenia, portal hypertension, varices, GI bleeding, peripheral edema, ascites, hepatic encephalopathy, and hepatocellular carcinoma. We discussed the need for follow ups on a regular basis, at 3 month intervals to monitor for complications. We discussed the need for every 6 month liver imaging studies. Serologic evaluation was negative for all causes of chronic liver disease. HCV. The patient has genotype 1A. The patient began HCV treatment on 03/18/2017 with once daily Harvoni. He will be treated for 12 weeks total.   The HCV has not been treated previously. He has no treatment related complaints. The patient was directed to continue all current medications at the current dosages. There are no contraindications for the patient to take any medications that are necessary for treatment of other medical issues. The patient stopped taking omeprazole before he began the HCV treatment. He has no complaints of reflux or heartburn.    I explained that it is fine to take the omeprazole as long as he either takes it with the Harvoni or 12 hours apart from it and does not increase the dose of the PPI above 20 mg/day. The patient was instructed not to take any antacids within 4 hours of taking the Harvoni. The patient verbalized understanding of these instructions. The patient was counseled regarding alcohol consumption. Vaccination for viral hepatitis B is recommended since the patient has no serologic evidence of previous exposure or vaccination with immunity. Vaccination for viral hepatitis A is not needed. The patient has serologic evidence of prior exposure or vaccination with immunity. [facility] [facility] screening was recently performed and does not suggest emerging [address] Repeat imaging in 08/2017. 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 No treatment necessary. Anemia is secondary to low iron stores. He is on iron supplementation. Esophageal varicies with prior bleeding. Varices have been banded to obliteration. The next EGD to assess for varices will be performed in 1/2018. All of the above issues were discussed with the patient. All questions were answered. The patient expressed a clear understanding of the above. [facility] in 20 weeks to assess for SVR 12.     Gokul Bazan, FNP-C   Liver [place] [facility]/ [address], [address], [place], [address] [place] [address]  [phone]

## 2017-04-20 LAB
AFP L3 MFR SERPL: 7 % (ref 0–9.9)
AFP SERPL-MCNC: 7.5 NG/ML (ref 0–8)
ALBUMIN SERPL-MCNC: 4.3 G/DL (ref 3.5–5.5)
ALP SERPL-CCNC: 80 IU/L (ref 39–117)
ALT SERPL-CCNC: 18 IU/L (ref 0–44)
AST SERPL-CCNC: 24 IU/L (ref 0–40)
BASOPHILS # BLD AUTO: 0.1 X10E3/UL (ref 0–0.2)
BASOPHILS NFR BLD AUTO: 1 %
BILIRUB DIRECT SERPL-MCNC: 0.18 MG/DL (ref 0–0.4)
BILIRUB SERPL-MCNC: 0.6 MG/DL (ref 0–1.2)
BUN SERPL-MCNC: 15 MG/DL (ref 6–24)
BUN/CREAT SERPL: 21 (ref 9–20)
CALCIUM SERPL-MCNC: 9.3 MG/DL (ref 8.7–10.2)
CHLORIDE SERPL-SCNC: 98 MMOL/L (ref 96–106)
CO2 SERPL-SCNC: 21 MMOL/L (ref 18–29)
CREAT SERPL-MCNC: 0.71 MG/DL (ref 0.76–1.27)
EOSINOPHIL # BLD AUTO: 0.2 X10E3/UL (ref 0–0.4)
EOSINOPHIL NFR BLD AUTO: 2 %
ERYTHROCYTE [DISTWIDTH] IN BLOOD BY AUTOMATED COUNT: 15 % (ref 12.3–15.4)
FERRITIN SERPL-MCNC: 32 NG/ML (ref 30–400)
GLUCOSE SERPL-MCNC: 88 MG/DL (ref 65–99)
HCT VFR BLD AUTO: 37.1 % (ref 37.5–51)
HCV RNA SERPL NAA+PROBE-ACNC: NORMAL IU/ML
HGB BLD-MCNC: 12.7 G/DL (ref 12.6–17.7)
IMM GRANULOCYTES # BLD: 0 X10E3/UL (ref 0–0.1)
IMM GRANULOCYTES NFR BLD: 0 %
INR PPP: 1.1 (ref 0.8–1.2)
IRON SATN MFR SERPL: 32 % (ref 15–55)
IRON SERPL-MCNC: 146 UG/DL (ref 38–169)
LYMPHOCYTES # BLD AUTO: 2.2 X10E3/UL (ref 0.7–3.1)
LYMPHOCYTES NFR BLD AUTO: 34 %
MCH RBC QN AUTO: 30 PG (ref 26.6–33)
MCHC RBC AUTO-ENTMCNC: 34.2 G/DL (ref 31.5–35.7)
MCV RBC AUTO: 88 FL (ref 79–97)
MONOCYTES # BLD AUTO: 0.5 X10E3/UL (ref 0.1–0.9)
MONOCYTES NFR BLD AUTO: 7 %
NEUTROPHILS # BLD AUTO: 3.7 X10E3/UL (ref 1.4–7)
NEUTROPHILS NFR BLD AUTO: 56 %
PLATELET # BLD AUTO: 127 X10E3/UL (ref 150–379)
POTASSIUM SERPL-SCNC: 5.2 MMOL/L (ref 3.5–5.2)
PROT SERPL-MCNC: 8.1 G/DL (ref 6–8.5)
PROTHROMBIN TIME: 11.8 SEC (ref 9.1–12)
RBC # BLD AUTO: 4.23 X10E6/UL (ref 4.14–5.8)
SODIUM SERPL-SCNC: 137 MMOL/L (ref 134–144)
TEST INFORMATION: NORMAL
TIBC SERPL-MCNC: 451 UG/DL (ref 250–450)
UIBC SERPL-MCNC: 305 UG/DL (ref 111–343)
WBC # BLD AUTO: 6.6 X10E3/UL (ref 3.4–10.8)

## 2017-04-26 ENCOUNTER — TELEPHONE (OUTPATIENT)
Dept: HEMATOLOGY | Age: 57
End: 2017-04-26

## 2017-09-06 ENCOUNTER — OFFICE VISIT (OUTPATIENT)
Dept: HEMATOLOGY | Age: 57
End: 2017-09-06

## 2017-09-06 VITALS
BODY MASS INDEX: 32.03 KG/M2 | HEART RATE: 90 BPM | OXYGEN SATURATION: 96 % | TEMPERATURE: 99 F | DIASTOLIC BLOOD PRESSURE: 87 MMHG | SYSTOLIC BLOOD PRESSURE: 142 MMHG | WEIGHT: 223.2 LBS

## 2017-09-06 DIAGNOSIS — I85.11 SECONDARY ESOPHAGEAL VARICES WITH BLEEDING (HCC): ICD-10-CM

## 2017-09-06 DIAGNOSIS — I10 ESSENTIAL HYPERTENSION, BENIGN: ICD-10-CM

## 2017-09-06 DIAGNOSIS — B18.2 CHRONIC HEPATITIS C WITHOUT HEPATIC COMA (HCC): Primary | ICD-10-CM

## 2017-09-06 DIAGNOSIS — K74.60 CIRRHOSIS OF LIVER WITHOUT ASCITES, UNSPECIFIED HEPATIC CIRRHOSIS TYPE (HCC): ICD-10-CM

## 2017-09-06 NOTE — MR AVS SNAPSHOT
Visit Information Date & Time Provider Department Dept. Phone Encounter #  
 9/6/2017  1:30 PM April VERONICA Santillan, [facility] 219 [id number] Follow-up Instructions Return in about 6 months (around 3/6/2018). Your Appointments 3/6/2018  3:00 PM  
Follow Up with Holly Cook NP [address] ([facility]) Appt Note: Follow up [facility] [phone] [facility] [address]  
[address] [facility] [address] Upcoming Health Maintenance Date Due Pneumococcal 19-64 Highest Risk (1 of 3 - PCV13) 11/10/1980 DTaP/Tdap/Td series (1 - Tdap) 11/10/1982 FOBT Q 1 YEAR AGE 50-75 11/10/2011 INFLUENZA AGE 9 TO ADULT 8/1/2017 Allergies as of 9/6/2017  Review Complete On: 9/6/2017 By: Holly Cook NP No Known Allergies Current Immunizations  Reviewed on 8/9/2010 No immunizations on file. Not reviewed this visit You Were Diagnosed With   
  
 Codes Comments Chronic hepatitis C without hepatic coma (HCC)    -  Primary ICD-10-CM: B18.2 ICD-9-CM: 070.54 Secondary esophageal varices with bleeding (HCC)     ICD-10-CM: I85.11 ICD-9-CM: 456.20 Essential hypertension, benign     ICD-10-CM: I10 
ICD-9-CM: 401.1 Cirrhosis of liver without ascites, unspecified hepatic cirrhosis type ([facility].)     ICD-10-CM: K74.60 ICD-9-CM: 571.5 Vitals BP Pulse Temp Weight(growth percentile) SpO2 BMI  
 142/87 90 99 °F (37.2 °C) (Tympanic) 223 lb 3.2 oz (101.2 kg) 96% 32.03 kg/m2 Smoking Status Current Every Day Smoker Vitals History BMI and BSA Data Body Mass Index Body Surface Area 32.03 kg/m 2 2.24 m 2 Preferred Pharmacy Pharmacy Name Phone Cecy Jones @ [address] [facility], [address] [place] [address] [phone] Your Updated Medication List  
  
   
 This list is accurate as of: 9/6/17  1:56 PM.  Always use your most recent med list. amLODIPine 5 mg tablet Commonly known as:  Griffin Landa Take 5 mg by mouth daily. lisinopril 40 mg tablet Commonly known as:  Tildon Asmita Take 40 mg by mouth daily. multivitamin tablet Commonly known as:  ONE A DAY Take 1 Tab by mouth daily. thiamine 100 mg tablet Commonly known as:  B-1 Take 1 Tab by mouth daily. We Performed the Following CBC W/O DIFF [55601 CPT(R)] FERRITIN [86273 CPT(R)] HCV RNA BY COLLETTE QL,RFLX TO QT [51226 CPT(R)] IRON PROFILE [id number] CPT(R)] METABOLIC PANEL, COMPREHENSIVE [98243 CPT(R)] Follow-up Instructions Return in about 6 months (around 3/6/2018). To-Do List   
 09/06/2017 Imaging:  [facility] [employer] Introducing [facility]! Dear Calderon Wade: Thank you for requesting a [employer] account. Our records indicate that you already have an active [employer] account. You can access your account anytime at [email or web address] Did you know that you can access your hospital and ER discharge instructions at any time in [employer]? You can also review all of your test results from your hospital stay or ER visit. Additional Information If you have questions, please visit the Frequently Asked Questions section of the [employer] website at [email or web address] Remember, [employer] is NOT to be used for urgent needs. For medical emergencies, dial 911. Now available from your iPhone and Android! Please provide this summary of care documentation to your next provider. Your primary care clinician is listed as Laura Ford. If you have any questions after today's visit, please call [phone].

## 2017-09-07 LAB
ALBUMIN SERPL-MCNC: 4.1 G/DL (ref 3.5–5.5)
ALBUMIN/GLOB SERPL: 1.1 {RATIO} (ref 1.2–2.2)
ALP SERPL-CCNC: 109 IU/L (ref 39–117)
ALT SERPL-CCNC: 41 IU/L (ref 0–44)
AST SERPL-CCNC: 62 IU/L (ref 0–40)
BILIRUB SERPL-MCNC: 0.8 MG/DL (ref 0–1.2)
BUN SERPL-MCNC: 15 MG/DL (ref 6–24)
BUN/CREAT SERPL: 19 (ref 9–20)
CALCIUM SERPL-MCNC: 9.2 MG/DL (ref 8.7–10.2)
CHLORIDE SERPL-SCNC: 102 MMOL/L (ref 96–106)
CO2 SERPL-SCNC: 25 MMOL/L (ref 18–29)
CREAT SERPL-MCNC: 0.79 MG/DL (ref 0.76–1.27)
ERYTHROCYTE [DISTWIDTH] IN BLOOD BY AUTOMATED COUNT: 14.6 % (ref 12.3–15.4)
FERRITIN SERPL-MCNC: 44 NG/ML (ref 30–400)
GLOBULIN SER CALC-MCNC: 3.8 G/DL (ref 1.5–4.5)
GLUCOSE SERPL-MCNC: 82 MG/DL (ref 65–99)
HCT VFR BLD AUTO: 39.5 % (ref 37.5–51)
HGB BLD-MCNC: 13.8 G/DL (ref 12.6–17.7)
IRON SATN MFR SERPL: 33 % (ref 15–55)
IRON SERPL-MCNC: 139 UG/DL (ref 38–169)
MCH RBC QN AUTO: 30.9 PG (ref 26.6–33)
MCHC RBC AUTO-ENTMCNC: 34.9 G/DL (ref 31.5–35.7)
MCV RBC AUTO: 88 FL (ref 79–97)
PLATELET # BLD AUTO: 127 X10E3/UL (ref 150–379)
POTASSIUM SERPL-SCNC: 4.6 MMOL/L (ref 3.5–5.2)
PROT SERPL-MCNC: 7.9 G/DL (ref 6–8.5)
RBC # BLD AUTO: 4.47 X10E6/UL (ref 4.14–5.8)
SODIUM SERPL-SCNC: 141 MMOL/L (ref 134–144)
TIBC SERPL-MCNC: 424 UG/DL (ref 250–450)
UIBC SERPL-MCNC: 285 UG/DL (ref 111–343)
WBC # BLD AUTO: 8.3 X10E3/UL (ref 3.4–10.8)

## 2017-09-07 NOTE — PROGRESS NOTES
[address] Amy Lynch MD, [address], [place], [place]       MAMIE Bello PA-C Everlean Rape, [facility]   MAMIE Xie NP        at [address], [address] [address]     [phone]     FAX: [phone]    at [address], [address] [facility], [address]     [phone]     FAX: [phone]     Patient Care Team:  Karis Spears DO as PCP - General (Family Practice)  Ora Colón MD (Gastroenterology)    Patient Active Problem List   Diagnosis Code    Essential hypertension, benign I10    Cigarette smoker F17.210    Chronic hepatitis C without hepatic coma ([address] B18.2    Anal cancer ([address] C21.0    Secondary esophageal varices with bleeding ([address] I85.11    Tobacco abuse Z72.0    Cirrhosis of liver without ascites ([address] K74.60       Lavon March returns to the [facility] today for education and management of chronic hepatitis C in the setting of cirrhosis. The active problem list, all pertinent past medical history, medications, liver histology, endoscopic studies, radiologic findings and laboratory findings related to the liver disorder were reviewed with the patient. The patient is a 55 y.o.  male who found out he had HCV when he developed variceal bleeding with melena in 7/2016. Ultrasound of the liver was performed in 02/2017. The results of the imaging suggested chronic liver disease. No hepatic masses. An assessment of liver fibrosis with fibroscan analysis was performed during this visit. The results suggest bridging fibrosis/cirrhosis. Patient completed 12 weeks of HCV with once daily Harvoni (3/2017-6/2017). He tolerated this treatment well. He returns to the clinic today to monitor his response to this treatment.      The most recent laboratory studies indicate that the liver transaminases are elevated, alkaline phosphatase is normal, tests of hepatic synthetic and metabolic function are normal, and the platelet count is depressed. The patient has not developed ascites. The patient has not developed edema. The patient has not developed hepatic encephalopathy. The patient has had prior variceal bleeding. Varices have been treated with band ligation. Last EGD in 2017 demonstrated no varices. The patient has no symptoms which can be attributed to the liver disorder. The patient completes all daily activities without any functional limitations. Today, patient denies abdominal pain, change in bowel habits, dark urine, myalgias, arthralgias, pruritus and problems concentrating. ALLERGIES  No Known Allergies    MEDICATIONS  Current Outpatient Prescriptions   Medication Sig    amLODIPine (NORVASC) 5 mg tablet Take 5 mg by mouth daily.  lisinopril (PRINIVIL, ZESTRIL) 40 mg tablet Take 40 mg by mouth daily.  thiamine (B-1) 100 mg tablet Take 1 Tab by mouth daily.  multivitamin (ONE A DAY) tablet Take 1 Tab by mouth daily. No current facility-administered medications for this visit. SYSTEM REVIEW NOT RELATED TO LIVER DISEASE OR REVIEWED ABOVE:  Constitution systems: Negative for fever, chills, weight gain, weight loss. Eyes: Negative for visual changes. ENT: Negative for sore throat, painful swallowing. Respiratory: Negative for cough, hemoptysis, SOB. Cardiology: Negative for chest pain, palpitations. GI:  Negative for constipation or diarrhea. : Negative for urinary frequency, dysuria, hematuria, nocturia. Skin: Negative for rash. Hematology: Negative for easy bruising, blood clots. Musculo-skelatal: Negative for back pain, muscle pain, weakness. Neurologic: Negative for headaches, dizziness, vertigo, memory problems not related to HE. Psychology: Negative for anxiety, depression.      FAMILY HISTORY:  The father  [facility].    The mother is alive and healthy. There is no family history of liver disease. SOCIAL HISTORY:  The patient is . The spouse has been tested for HCV and is negative. The patient has 1 child and 1 grandchildren. The patient currently smokes half pack of tobacco daily. The patient has previously consumed alcohol socially never in excess. The patient has been abstinent from alcohol since 7/2016. The patient currently works full time as a .      PHYSICAL EXAMINATION:  Visit Vitals    /87    Pulse 90    Temp 99 °F (37.2 °C) (Tympanic)    Wt 223 lb 3.2 oz (101.2 kg)    SpO2 96%    BMI 32.03 kg/m2     General: No acute distress. Eyes: Sclera anicteric. ENT: No oral lesions. Thyroid normal.  Nodes: No adenopathy. Skin: No spider angiomata. No jaundice. No palmar erythema. Respiratory: Lungs clear to auscultation. Cardiovascular: Regular heart rate. No murmurs. No JVD. Abdomen: Soft non-tender. Liver size normal to percussion/palpation. Spleen not palpable. No obvious ascites. Extremities: No edema. No muscle wasting. No gross arthritic changes. Neurologic: Alert and oriented. Cranial nerves grossly intact. No asterixis.     LABORATORY STUDIES:  Liver [place] of [address] [place] [address] Units 9/6/2017 4/19/2017 1/13/2017   WBC 3.4 - 10.8 x10E3/uL 8.3 6.6 6.2   ANC 1.4 - 7.0 x10E3/uL  3.7 3.2   HGB 12.6 - 17.7 g/dL 13.8 12.7 12.3 (L)    - 379 x10E3/uL 127 (L) 127 (L) 140 (L)   INR 0.8 - 1.2  1.1 1.1   AST 0 - 40 IU/L 62 (H) 24 78 (H)   ALT 0 - 44 IU/L 41 18 51 (H)   Alk Phos 39 - 117 IU/L 109 80 111   Bili, Total 0.0 - 1.2 mg/dL 0.8 0.6 0.5   Bili, Direct 0.00 - 0.40 mg/dL  0.18 0.24   Albumin 3.5 - 5.5 g/dL 4.1 4.3 4.2   BUN 6 - 24 mg/dL 15 15 17   Creat 0.76 - 1.27 mg/dL 0.79 0.71 (L) 0.76   Na 134 - 144 mmol/L 141 137 143   K 3.5 - 5.2 mmol/L 4.6 5.2 4.6   Cl 96 - 106 mmol/L 102 98 102   CO2 18 - 29 mmol/L 25 21 23   Glucose 65 - 99 mg/dL 82 88 93   Magnesium 1.6 - 2.4 mg/dL      Virology Latest Ref Rng & Units   1/13/2017   HCV RNA, COLLETTE, QL Negative   Positive (A)     A CMP, CBC and HCV RNA were ordered today. Will review results when available. SEROLOGIES:  Serologies Latest Ref Rng & Units 1/13/2017 7/12/2016   Hep A Ab, Total Negative Positive (A)    Hep B Surface Ag Index  <0.10   Hep B Surface Ag Interp NEG    NEGATIVE   Hep B Core Ab, Total Negative Negative    Hep B Surface AB QL  Non Reactive    Hep C Ab NR    REACTIVE (A)   Ferritin 30 - 400 ng/mL 20 (L) 190   Iron % Saturation 15 - 55 % 7 (LL) 21     LIVER HISTOLOGY:  02/2017. FibroScan performed at [address]. EkPa was 13.8. Suggested fibrosis level is F3/F4. The wide IQR of 2.2 also suggests fatty liver. ENDOSCOPIC PROCEDURES:  7/2016. EGD by Dr Irena Rashid. Esophagela varices. Banding performed. 11/2016. EGD by Dr Irena Rashid. Esophagela varices. Banding performed. 1/2017. EGD by Dr Irena Rashid. Scars of prior banding. No varices. RADIOLOGY:  7/2016. Ultrasound of liver. Echogenic consistent with chronic liver disease. No liver mass lesions. No dilated bile ducts. No ascites. 02/2017. Ultrasound of the liver. Mildly echogenic heterogeneous liver with no focal abnormality detected. OTHER TESTING:  Not available or performed    ASSESSMENT AND PLAN:  Chronic HCV with cirrhosis. Liver function remains normal. He will be monitored regularly. Complications of cirrhosis were discussed in detail. We discussed thrombocytopenia, portal hypertension, varices, GI bleeding, peripheral edema, ascites, hepatic encephalopathy, and hepatocellular carcinoma. We discussed the need for follow ups on a regular basis, at 3 month intervals to monitor for complications. We discussed the need for every 6 month liver imaging studies. Serologic evaluation was negative for all causes of chronic liver disease. HCV. The patient has genotype 1A.  Patient completed HCV treatment with with once daily Harvoni in June 2017. He tolerated treatment well. Will review today's HCV RNA when available. The patient was directed to continue all current medications at the current dosages. There are no contraindications for the patient to take any medications that are necessary for treatment of other medical issues. The patient was counseled regarding alcohol consumption. Vaccination for viral hepatitis B is recommended since the patient has no serologic evidence of previous exposure or vaccination with immunity. Vaccination for viral hepatitis A is not needed. The patient has serologic evidence of prior exposure or vaccination with immunity. [facility] [address] screening was recently performed and does not suggest emerging [address] Repeat imaging was ordered today to remain up to date. 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 No treatment necessary. Iron deficiency anemia. He is on iron supplementation. This has been effective. Directed patient to continue this. Esophageal varicies with prior bleeding. Varices have been banded to obliteration. The next EGD to assess for varices will be performed in 1/2018. Encouraged patient to keep this appointment with Dr. Hernán Machado. All of the above issues were discussed with the patient. All questions were answered. The patient expressed a clear understanding of the above. [facility] 87 in 6 months.     Samantha Fong NP  [address] [address], [address] [place], [place] [address]  Ph: [phone]  Fax: [phone]

## 2017-09-09 LAB
HCV RNA SERPL NAA+PROBE-ACNC: NORMAL IU/ML
HCV RNA SERPL NAA+PROBE-LOG IU: 6.13 LOG10 IU/ML
HCV RNA SERPL QL NAA+PROBE: POSITIVE
TEST INFORMATION: NORMAL

## 2017-09-11 NOTE — PROGRESS NOTES
Patient relapsed to 12 weeks of [place]. He will return to clinic on 9/12/2017 to screen for a clinical trial utilizing a newer DAA that's indicated for Harvoni failures.     April

## 2017-09-12 DIAGNOSIS — B18.2 CHRONIC HEPATITIS C WITHOUT HEPATIC COMA (HCC): Primary | ICD-10-CM

## 2017-12-05 ENCOUNTER — OFFICE VISIT (OUTPATIENT)
Dept: HEMATOLOGY | Age: 57
End: 2017-12-05

## 2017-12-05 VITALS
SYSTOLIC BLOOD PRESSURE: 125 MMHG | DIASTOLIC BLOOD PRESSURE: 75 MMHG | OXYGEN SATURATION: 96 % | BODY MASS INDEX: 31.85 KG/M2 | HEART RATE: 93 BPM | TEMPERATURE: 97.2 F | WEIGHT: 222 LBS

## 2017-12-05 DIAGNOSIS — K74.60 CIRRHOSIS OF LIVER WITHOUT ASCITES, UNSPECIFIED HEPATIC CIRRHOSIS TYPE (HCC): ICD-10-CM

## 2017-12-05 DIAGNOSIS — B18.2 CHRONIC HEPATITIS C WITHOUT HEPATIC COMA (HCC): Primary | ICD-10-CM

## 2017-12-05 DIAGNOSIS — I85.11 SECONDARY ESOPHAGEAL VARICES WITH BLEEDING (HCC): ICD-10-CM

## 2017-12-05 NOTE — MR AVS SNAPSHOT
Visit Information Date & Time Provider Department Dept. Phone Encounter #  
 12/5/2017  2:30 PM April VERONICA Grimes, [address] [facility] 219 [id number] Follow-up Instructions Return in about 2 months (around 2/5/2018). Your Appointments 3/6/2018  3:00 PM  
Follow Up with MAMIE Suarez 75 ([facility] Appt Note: Follow up 200 [facility] [phone] [facility] [address]  
[facility] [address] Upcoming Health Maintenance Date Due Pneumococcal 19-64 Highest Risk (1 of 3 - PCV13) 11/10/1980 DTaP/Tdap/Td series (1 - Tdap) 11/10/1982 FOBT Q 1 YEAR AGE 50-75 11/10/2011 Influenza Age 5 to Adult 8/1/2017 Allergies as of 12/5/2017  Review Complete On: 12/5/2017 By: Telma Mayfield No Known Allergies Current Immunizations  Reviewed on 8/9/2010 No immunizations on file. Not reviewed this visit You Were Diagnosed With   
  
 Codes Comments Chronic hepatitis C without hepatic coma (HCC)    -  Primary ICD-10-CM: B18.2 ICD-9-CM: 070.54 Cirrhosis of liver without ascites, unspecified hepatic cirrhosis type ([facility] [address]     ICD-10-CM: K74.60 ICD-9-CM: 571.5 Secondary esophageal varices with bleeding (HCC)     ICD-10-CM: I85.11 ICD-9-CM: 456.20 Vitals BP Pulse Temp Weight(growth percentile) SpO2 BMI  
 125/75 93 97.2 °F (36.2 °C) (Oral) 222 lb (100.7 kg) 96% 31.85 kg/m2 Smoking Status Current Every Day Smoker BMI and BSA Data Body Mass Index Body Surface Area  
 31.85 kg/m 2 2.23 m 2 Preferred Pharmacy Pharmacy Name Phone [address] [place] [address], [address] [place] [address] Deni João Fox Said [phone] Your Updated Medication List  
  
   
This list is accurate as of: 12/5/17  2:48 PM.  Always use your most recent med list. amLODIPine 5 mg tablet Commonly known as:  Tenzin Carlos Take 5 mg by mouth daily. lisinopril 40 mg tablet Commonly known as:  Emelyn Quinones Take 40 mg by mouth daily. multivitamin tablet Commonly known as:  ONE A DAY Take 1 Tab by mouth daily. sofosbuvir-velpatas-voxilaprev 400-100-100 mg Tab Commonly known as:  Ding Pleva Take 1 Tab by mouth daily. thiamine 100 mg tablet Commonly known as:  B-1 Take 1 Tab by mouth daily. We Performed the Following CBC W/O DIFF [68602 CPT(R)] HCV RNA BY COLLETTE QL,RFLX TO QT [77442 CPT(R)] METABOLIC PANEL, COMPREHENSIVE [03261 CPT(R)] Follow-up Instructions Return in about 2 months (around 2/5/2018). To-Do List   
 12/05/2017 Imaging:  [facility] [employer] Introducing [facility] & [facility] SERVICES! Dear Arnaldo Orozco: Thank you for requesting a [employer] account. Our records indicate that you already have an active [email or web address] account. You can access your account anytime at [email or web address] Did you know that you can access your hospital and ER discharge instructions at any time in [employer]? You can also review all of your test results from your hospital stay or ER visit. Additional Information If you have questions, please visit the Frequently Asked Questions section of the [email or web address] website at [email or web address] Remember, [email or web address] is NOT to be used for urgent needs. For medical emergencies, dial 911. Now available from your iPhone and Android! Please provide this summary of care documentation to your next provider. Your primary care clinician is listed as Tabitha Escalante. If you have any questions after today's visit, please call [phone].

## 2017-12-05 NOTE — PROGRESS NOTES
Patient presents for a follow up   1. Have you been to the ER, urgent care clinic since your last visit? Hospitalized since your last visit? No    2. Have you seen or consulted any other health care providers outside of the [address] [place] [address] since your last visit? Include any pap smears or colon screening.  No   Visit Vitals    /75    Pulse 93    Temp 97.2 °F (36.2 °C) (Oral)    Wt 222 lb (100.7 kg)    SpO2 96%    BMI 31.85 kg/m2     PHQ over the last two weeks 12/5/2017   Little interest or pleasure in doing things Not at all   Feeling down, depressed or hopeless Not at all   Total Score PHQ 2 0     Learning Assessment 12/5/2017   PRIMARY LEARNER Patient   PRIMARY LANGUAGE ENGLISH   LEARNER PREFERENCE PRIMARY LISTENING   ANSWERED BY patient    RELATIONSHIP SELF

## 2017-12-06 LAB
ALBUMIN SERPL-MCNC: 4.5 G/DL (ref 3.5–5.5)
ALBUMIN/GLOB SERPL: 1.3 {RATIO} (ref 1.2–2.2)
ALP SERPL-CCNC: 93 IU/L (ref 39–117)
ALT SERPL-CCNC: 18 IU/L (ref 0–44)
AST SERPL-CCNC: 20 IU/L (ref 0–40)
BILIRUB SERPL-MCNC: 0.5 MG/DL (ref 0–1.2)
BUN SERPL-MCNC: 16 MG/DL (ref 6–24)
BUN/CREAT SERPL: 17 (ref 9–20)
CALCIUM SERPL-MCNC: 9.4 MG/DL (ref 8.7–10.2)
CHLORIDE SERPL-SCNC: 104 MMOL/L (ref 96–106)
CO2 SERPL-SCNC: 19 MMOL/L (ref 18–29)
CREAT SERPL-MCNC: 0.92 MG/DL (ref 0.76–1.27)
ERYTHROCYTE [DISTWIDTH] IN BLOOD BY AUTOMATED COUNT: 14.3 % (ref 12.3–15.4)
GFR SERPLBLD CREATININE-BSD FMLA CKD-EPI: 107 ML/MIN/1.73
GFR SERPLBLD CREATININE-BSD FMLA CKD-EPI: 93 ML/MIN/1.73
GLOBULIN SER CALC-MCNC: 3.6 G/DL (ref 1.5–4.5)
GLUCOSE SERPL-MCNC: 85 MG/DL (ref 65–99)
HCT VFR BLD AUTO: 38.9 % (ref 37.5–51)
HGB BLD-MCNC: 13.3 G/DL (ref 13–17.7)
MCH RBC QN AUTO: 30 PG (ref 26.6–33)
MCHC RBC AUTO-ENTMCNC: 34.2 G/DL (ref 31.5–35.7)
MCV RBC AUTO: 88 FL (ref 79–97)
PLATELET # BLD AUTO: 151 X10E3/UL (ref 150–379)
POTASSIUM SERPL-SCNC: 4.7 MMOL/L (ref 3.5–5.2)
PROT SERPL-MCNC: 8.1 G/DL (ref 6–8.5)
RBC # BLD AUTO: 4.43 X10E6/UL (ref 4.14–5.8)
SODIUM SERPL-SCNC: 143 MMOL/L (ref 134–144)
WBC # BLD AUTO: 8.3 X10E3/UL (ref 3.4–10.8)

## 2017-12-07 LAB — HCV RNA SERPL QL NAA+PROBE: NEGATIVE

## 2017-12-12 NOTE — PROGRESS NOTES
[address] Amy Lynch MD, Che Land[place], [place], [place]       Ev Duncan, TERE Russell, [facility]   MAMIE Navarro NP        at [address], [address] [address]     [phone]     FAX: [phone]    at [facility], [address] [place] [address], [address]     [phone]     FAX: [phone]     Patient Care Team:  Henna Tinajero DO as PCP - General (Family Practice)  Tacos Cabrera MD (Gastroenterology)    Patient Active Problem List   Diagnosis Code    Essential hypertension, benign I10    Cigarette smoker F17.210    Chronic hepatitis C without hepatic coma ([address] B18.2    Anal cancer ([address] C21.0    Secondary esophageal varices with bleeding ([address] I85.11    Tobacco abuse Z72.0    Cirrhosis of liver without ascites ([address] K74.60       [facility] returns to the [employer] today for education and management of chronic hepatitis C in the setting of cirrhosis. The active problem list, all pertinent past medical history, medications, liver histology, endoscopic studies, radiologic findings and laboratory findings related to the liver disorder were reviewed with the patient. The patient is a 65 y.o.  male who found out he had HCV when he developed variceal bleeding with melena in 7/2016. Ultrasound of the liver was performed in 02/2017. The results of the imaging suggested chronic liver disease. No hepatic masses. An assessment of liver fibrosis with fibroscan analysis was performed during this visit. The results suggest bridging fibrosis/cirrhosis. Patient completed 12 weeks of HCV with once daily Harvoni (3/2017-6/2017). Unfortunately, he relapsed 3 months post treatment. He is now being retreated with Vosevi x 8 weeks (started-10/10/2017).  He has been tolerated treatment well with no significant side effects since started treatment. He returns to the clinic today to monitor his response to treatment. The patient has not developed ascites. The patient has not developed edema. The patient has not developed hepatic encephalopathy. The patient has had prior variceal bleeding. Varices have been treated with band ligation. Last EGD in 2017 demonstrated no varices. The patient has no symptoms which can be attributed to the liver disorder. The patient completes all daily activities without any functional limitations. Today, patient denies abdominal pain, change in bowel habits, dark urine, myalgias, arthralgias, pruritus and problems concentrating. ALLERGIES  No Known Allergies    MEDICATIONS  Current Outpatient Prescriptions   Medication Sig    sofosbuvir-velpatas-voxilaprev (VOSEVI) 400-100-100 mg tab Take 1 Tab by mouth daily.  amLODIPine (NORVASC) 5 mg tablet Take 5 mg by mouth daily.  lisinopril (PRINIVIL, ZESTRIL) 40 mg tablet Take 40 mg by mouth daily.  multivitamin (ONE A DAY) tablet Take 1 Tab by mouth daily. No current facility-administered medications for this visit. SYSTEM REVIEW NOT RELATED TO LIVER DISEASE OR REVIEWED ABOVE:  Constitution systems: Negative for fever, chills, weight gain, weight loss. Eyes: Negative for visual changes. ENT: Negative for sore throat, painful swallowing. Respiratory: Negative for cough, hemoptysis, SOB. Cardiology: Negative for chest pain, palpitations. GI:  Negative for constipation or diarrhea. : Negative for urinary frequency, dysuria, hematuria, nocturia. Skin: Negative for rash. Hematology: Negative for easy bruising, blood clots. Musculo-skelatal: Negative for back pain, muscle pain, weakness. Neurologic: Negative for headaches, dizziness, vertigo, memory problems not related to HE. Psychology: Negative for anxiety, depression. FAMILY HISTORY:  The father  of MI.     The mother is alive and healthy. There is no family history of liver disease. SOCIAL HISTORY:  The patient is . The spouse has been tested for HCV and is negative. The patient has 1 child and 1 grandchildren. The patient currently smokes half pack of tobacco daily. The patient has previously consumed alcohol socially never in excess. The patient has been abstinent from alcohol since 7/2016. The patient currently works full time as a .      PHYSICAL EXAMINATION:  Visit Vitals    /75    Pulse 93    Temp 97.2 °F (36.2 °C) (Oral)    Wt 222 lb (100.7 kg)    SpO2 96%    BMI 31.85 kg/m2     General: No acute distress. Eyes: Sclera anicteric. ENT: No oral lesions. Thyroid normal.  Nodes: No adenopathy. Skin: No spider angiomata. No jaundice. No palmar erythema. Respiratory: Lungs clear to auscultation. Cardiovascular: Regular heart rate. No murmurs. No JVD. Abdomen: Soft non-tender. Liver size normal to percussion/palpation. Spleen not palpable. No obvious ascites. Extremities: No edema. No muscle wasting. No gross arthritic changes. Neurologic: Alert and oriented. Cranial nerves grossly intact. No asterixis.     LABORATORY STUDIES:  Liver [place] of [address] Units 12/5/2017 9/6/2017   WBC 3.4 - 10.8 x10E3/uL 8.3 8.3   ANC 1.4 - 7.0 x10E3/uL     HGB 13.0 - 17.7 g/dL 13.3 13.8    - 379 x10E3/uL 151 127 (L)   INR 0.8 - 1.2     AST 0 - 40 IU/L 20 62 (H)   ALT 0 - 44 IU/L 18 41   Alk Phos 39 - 117 IU/L 93 109   Bili, Total 0.0 - 1.2 mg/dL 0.5 0.8   Bili, Direct 0.00 - 0.40 mg/dL     Albumin 3.5 - 5.5 g/dL 4.5 4.1   BUN 6 - 24 mg/dL 16 15   Creat 0.76 - 1.27 mg/dL 0.92 0.79   Na 134 - 144 mmol/L 143 141   K 3.5 - 5.2 mmol/L 4.7 4.6   Cl 96 - 106 mmol/L 104 102   CO2 18 - 29 mmol/L 19 25   Glucose 65 - 99 mg/dL 85 82   Magnesium 1.6 - 2.4 mg/dL     Virology Latest Ref Rng & Units 12/5/2017 9/6/2017   HCV RNA, COLLETTE, QL Negative Negative Positive (A)     A CMP, CBC and HCV RNA were ordered today. Will review results when available. SEROLOGIES:  Serologies Latest Ref Rng & Units 1/13/2017 7/12/2016   Hep A Ab, Total Negative Positive (A)    Hep B Surface Ag Index  <0.10   Hep B Surface Ag Interp NEG    NEGATIVE   Hep B Core Ab, Total Negative Negative    Hep B Surface AB QL  Non Reactive    Hep C Ab NR    REACTIVE (A)   Ferritin 30 - 400 ng/mL 20 (L) 190   Iron % Saturation 15 - 55 % 7 (LL) 21     LIVER HISTOLOGY:  02/2017. FibroScan performed at [address]. EkPa was 13.8. Suggested fibrosis level is F3/F4. The wide IQR of 2.2 also suggests fatty liver. ENDOSCOPIC PROCEDURES:  7/2016. EGD by Dr Kem Contreras. Esophagela varices. Banding performed. 11/2016. EGD by Dr Kem Contreras. Esophagela varices. Banding performed. 1/2017. EGD by Dr Kem Contreras. Scars of prior banding. No varices. RADIOLOGY:  7/2016. Ultrasound of liver. Echogenic consistent with chronic liver disease. No liver mass lesions. No dilated bile ducts. No ascites. 02/2017. Ultrasound of the liver. Mildly echogenic heterogeneous liver with no focal abnormality detected. OTHER TESTING:  Not available or performed    ASSESSMENT AND PLAN:  Chronic HCV with cirrhosis. Liver function remains normal. He will be monitored regularly. Complications of cirrhosis were discussed in detail. We discussed thrombocytopenia, portal hypertension, varices, GI bleeding, peripheral edema, ascites, hepatic encephalopathy, and hepatocellular carcinoma. We discussed the need for follow ups on a regular basis, at 3 month intervals to monitor for complications. We discussed the need for every 6 month liver imaging studies. HCV. The patient has genotype 1A. Relapsed to 12 weeks of once daily Harvoni in June 2017. Initiated re-treatment with Vosevi in October 2017. Will review today's HCV RNA when available. He will complete 12 weeks of treatment.     The patient was directed to continue all current medications at the current dosages. There are no contraindications for the patient to take any medications that are necessary for treatment of other medical issues. The patient was counseled regarding alcohol consumption. Vaccination for viral hepatitis B is recommended since the patient has no serologic evidence of previous exposure or vaccination with immunity. Vaccination for viral hepatitis A is not needed. The patient has serologic evidence of prior exposure or vaccination with immunity. [address] screening. This should have been completed in August 2017 to remain up to date. This was ordered again today to rule out [address] 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 No treatment necessary. Iron deficiency anemia. He is on iron supplementation. This has been effective. Directed patient to continue this. Esophageal varicies with prior bleeding. Varices have been banded to obliteration. The next EGD to assess for varices will be performed in 1/2018. Encouraged patient to keep this appointment with Dr. Benita Baker. All of the above issues were discussed with the patient. All questions were answered. The patient expressed a clear understanding of the above. [address] [place] [address] in 4 months.     Marcello [place]  [address] [place] [address] [address] [place] [address]  Ph: [phone]  Fax: [phone]

## 2018-04-18 ENCOUNTER — OFFICE VISIT (OUTPATIENT)
Dept: HEMATOLOGY | Age: 58
End: 2018-04-18

## 2018-04-18 VITALS
BODY MASS INDEX: 32.14 KG/M2 | HEART RATE: 98 BPM | OXYGEN SATURATION: 95 % | TEMPERATURE: 98.4 F | DIASTOLIC BLOOD PRESSURE: 80 MMHG | SYSTOLIC BLOOD PRESSURE: 133 MMHG | WEIGHT: 224 LBS

## 2018-04-18 DIAGNOSIS — B18.2 CHRONIC HEPATITIS C WITHOUT HEPATIC COMA (HCC): ICD-10-CM

## 2018-04-18 DIAGNOSIS — I85.11 SECONDARY ESOPHAGEAL VARICES WITH BLEEDING (HCC): ICD-10-CM

## 2018-04-18 DIAGNOSIS — K74.60 CIRRHOSIS OF LIVER WITHOUT ASCITES, UNSPECIFIED HEPATIC CIRRHOSIS TYPE (HCC): Primary | ICD-10-CM

## 2018-04-18 RX ORDER — BUPROPION HYDROCHLORIDE 150 MG/1
TABLET, EXTENDED RELEASE ORAL
Refills: 4 | COMMUNITY
Start: 2018-04-03 | End: 2018-04-18 | Stop reason: ALTCHOICE

## 2018-04-18 NOTE — PROGRESS NOTES
[address] Amy Lynch MD, [address], [place] [facility], [place]       MAMIE Sandoval PA-C Fae Kappa, TEDDYP-BC   MAMIE Montana NP        at [place] [id number] [facility], [address] [address]     [phone]     FAX: [phone]    at [address], [address] [facility], [address]     [phone]     FAX: [phone]     Patient Care Team:  Coral Cox DO as PCP - General (Family Practice)  Christ Correa MD (Gastroenterology)    Patient Active Problem List   Diagnosis Code    Essential hypertension, benign I10    Cigarette smoker F17.210    Chronic hepatitis C without hepatic coma ([facility] [address] B18.2    Anal cancer ([address] C21.0    Secondary esophageal varices with bleeding ([address] I85.11    Tobacco abuse Z72.0    Cirrhosis of liver without ascites ([address] K74.60       Sheri Centeno returns to the [place] [address] today for education and management of chronic hepatitis C in the setting of cirrhosis. The active problem list, all pertinent past medical history, medications, liver histology, endoscopic studies, radiologic findings and laboratory findings related to the liver disorder were reviewed with the patient. The patient is a 65 y.o.  male who found out he had HCV when he developed variceal bleeding with melena in 7/2016. Ultrasound of the liver was performed in 02/2017. The results of the imaging suggested chronic liver disease. No hepatic masses. Repeat imaging has been ordered but still not performed. An assessment of liver fibrosis with fibroscan analysis was performed during this visit. The results suggest bridging fibrosis/cirrhosis. Patient completed 12 weeks of HCV with once daily Harvoni (3/2017-6/2017). Unfortunately, he relapsed 3 months post treatment.  He has now been retreated with 12 weeks of Vosevi (10/2017-2018). He tolerated treatment well with no significant side effects throughout treatment. He returns to the clinic today to monitor his response to treatment. The patient has not developed ascites. The patient has not developed edema. The patient has not developed hepatic encephalopathy. The patient has had prior variceal bleeding. Varices have been treated with band ligation. Last EGD in 2017 demonstrated no varices. The patient has no symptoms which can be attributed to the liver disorder. The patient completes all daily activities without any functional limitations. Today, patient denies abdominal pain, change in bowel habits, dark urine, myalgias, arthralgias, pruritus and problems concentrating. ALLERGIES  No Known Allergies    MEDICATIONS  Current Outpatient Prescriptions   Medication Sig    amLODIPine (NORVASC) 5 mg tablet Take 5 mg by mouth daily.  lisinopril (PRINIVIL, ZESTRIL) 40 mg tablet Take 40 mg by mouth daily.  multivitamin (ONE A DAY) tablet Take 1 Tab by mouth daily. No current facility-administered medications for this visit. SYSTEM REVIEW NOT RELATED TO LIVER DISEASE OR REVIEWED ABOVE:  Constitution systems: Negative for fever, chills, weight gain, weight loss. Eyes: Negative for visual changes. ENT: Negative for sore throat, painful swallowing. Respiratory: Negative for cough, hemoptysis, SOB. Cardiology: Negative for chest pain, palpitations. GI:  Negative for constipation or diarrhea. : Negative for urinary frequency, dysuria, hematuria, nocturia. Skin: Negative for rash. Hematology: Negative for easy bruising, blood clots. Musculo-skelatal: Negative for back pain, muscle pain, weakness. Neurologic: Negative for headaches, dizziness, vertigo, memory problems not related to HE. Psychology: Negative for anxiety, depression. FAMILY HISTORY:  The father  of MI. The mother is alive and healthy.     There is no family history of liver disease. SOCIAL HISTORY:  The patient is . The spouse has been tested for HCV and is negative. The patient has 1 child and 1 grandchildren. The patient currently smokes half pack of tobacco daily. The patient has previously consumed alcohol socially never in excess. The patient has been abstinent from alcohol since 7/2016. The patient currently works full time as a .      PHYSICAL EXAMINATION:  Visit Vitals    /80 (BP 1 Location: Left arm, BP Patient Position: Sitting)    Pulse 98    Temp 98.4 °F (36.9 °C) (Tympanic)    Wt 224 lb (101.6 kg)    SpO2 95%    BMI 32.14 kg/m2     General: No acute distress. Eyes: Sclera anicteric. ENT: No oral lesions. Thyroid normal.  Nodes: No adenopathy. Skin: No spider angiomata. No jaundice. No palmar erythema. Respiratory: Lungs clear to auscultation. Cardiovascular: Regular heart rate. No murmurs. No JVD. Abdomen: Soft non-tender. Liver size normal to percussion/palpation. Spleen not palpable. No obvious ascites. Extremities: No edema. No muscle wasting. No gross arthritic changes. Neurologic: Alert and oriented. Cranial nerves grossly intact. No asterixis.     LABORATORY STUDIES:  Liver [place] of [address] Units 4/18/2018 12/5/2017   WBC 3.4 - 10.8 x10E3/uL 8.3 8.3   ANC 1.4 - 7.0 x10E3/uL     HGB 13.0 - 17.7 g/dL 14.0 13.3    - 379 x10E3/uL 142 (L) 151   INR 0.8 - 1.2     AST 0 - 40 IU/L 22 20   ALT 0 - 44 IU/L 21 18   Alk Phos 39 - 117 IU/L 88 93   Bili, Total 0.0 - 1.2 mg/dL 0.6 0.5   Bili, Direct 0.00 - 0.40 mg/dL     Albumin 3.5 - 5.5 g/dL 4.2 4.5   BUN 6 - 24 mg/dL 13 16   Creat 0.76 - 1.27 mg/dL 0.74 (L) 0.92   Na 134 - 144 mmol/L 142 143   K 3.5 - 5.2 mmol/L 4.5 4.7   Cl 96 - 106 mmol/L 102 104   CO2 18 - 29 mmol/L 23 19   Glucose 65 - 99 mg/dL 89 85   Magnesium 1.6 - 2.4 mg/dL     Virology Latest Ref Rng & Units  12/5/2017   HCV RNA, COLLETTE, QL Negative Negative     A CMP, CBC and HCV RNA were ordered today. Will review results when available. SEROLOGIES:  Serologies Latest Ref Rng & Units 1/13/2017 7/12/2016   Hep A Ab, Total Negative Positive (A)    Hep B Surface Ag Index  <0.10   Hep B Surface Ag Interp NEG    NEGATIVE   Hep B Core Ab, Total Negative Negative    Hep B Surface AB QL  Non Reactive    Hep C Ab NR    REACTIVE (A)   Ferritin 30 - 400 ng/mL 20 (L) 190   Iron % Saturation 15 - 55 % 7 (LL) 21     LIVER HISTOLOGY:  02/2017. [facility]. EkPa was 13.8. Suggested fibrosis level is F3/F4. The wide IQR of 2.2 also suggests fatty liver. ENDOSCOPIC PROCEDURES:  7/2016. EGD by Dr Joanne Ferrer. Esophagela varices. Banding performed. 11/2016. EGD by Dr Joanne Ferrer. Esophagela varices. Banding performed. 1/2017. EGD by Dr Joanne Ferrer. Scars of prior banding. No varices. RADIOLOGY:  7/2016. Ultrasound of liver. Echogenic consistent with chronic liver disease. No liver mass lesions. No dilated bile ducts. No ascites. 2/2017. Ultrasound of the liver. Mildly echogenic heterogeneous liver with no focal abnormality detected. OTHER TESTING:  Not available or performed    ASSESSMENT AND PLAN:  Chronic HCV with cirrhosis. Liver function remains normal. He will be monitored regularly. Complications of cirrhosis were discussed in detail. We discussed thrombocytopenia, portal hypertension, varices, GI bleeding, peripheral edema, ascites, hepatic encephalopathy, and hepatocellular carcinoma. We discussed the need for follow ups on a regular basis, at 3 month intervals to monitor for complications. We discussed the need for every 6 month liver imaging studies. HCV. The patient has genotype 1A. Relapsed to 12 weeks of once daily Harvoni in June 2017. Completed re-treatment with 12 weeks of Vosevi in January 2018. Will review today's HCV RNA when available.  He will be monitored long-term due to his advanced liver disease. [address] screening. This should have been completed in August 2017 to remain up to date. This has still not been done. This was ordered again today to rule out [address] Discussed the importance of getting this done due to his higher risk of HCC. Patient verbalized understanding. The patient was directed to continue all current medications at the current dosages. There are no contraindications for the patient to take any medications that are necessary for treatment of other medical issues. The patient was counseled regarding alcohol consumption. Vaccination for viral hepatitis B is recommended since the patient has no serologic evidence of previous exposure or vaccination with immunity. Vaccination for viral hepatitis A is not needed. The patient has serologic evidence of prior exposure or vaccination with immunity. 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 No treatment necessary. Iron deficiency anemia. He is on iron supplementation. This has been effective. Directed patient to continue this. Esophageal varicies with prior bleeding. Varices have been banded to obliteration. The next EGD to assess for varices should have been performed in 1/2018. Patient has an appointment with his GI provider in May 2018. Encouraged patient to keep this appointment with Dr. Irena Rashid. All of the above issues were discussed with the patient. All questions were answered. The patient expressed a clear understanding of the above. [facility] in 6 months.     Milo Patel NP  [address] [address]  Ph: [phone]  Fax: [phone]

## 2018-04-18 NOTE — MR AVS SNAPSHOT
110 [facility] [place] [phone] [address] 
[phone] Patient: Eleazar Cruz MRN:  [id number] Visit Information Date & Time Provider Department Dept. Phone Encounter #  
 2018  3:30 PM Holly Grewal, 3[address] [facility] 219 [id number] Follow-up Instructions Return in about 6 months (around 10/18/2018). Your Appointments 10/18/2018  3:00 PM  
Follow Up with MAMIE Erwin 75 ([facility]) Appt Note: Follow up 200 [facility] [place] [phone] [facility] [address]  
[address] [facility] [address] Upcoming Health Maintenance Date Due Pneumococcal 19-64 Highest Risk (1 of 3 - PCV13) 11/10/1980 DTaP/Tdap/Td series (1 - Tdap) 11/10/1982 FOBT Q 1 YEAR AGE 50-75 11/10/2011 Influenza Age 5 to Adult 2017 Allergies as of 2018  Review Complete On: 2018 By: Holly Anderson NP No Known Allergies Current Immunizations  Reviewed on 2010 No immunizations on file. Not reviewed this visit You Were Diagnosed With   
  
 Codes Comments Cirrhosis of liver without ascites, unspecified hepatic cirrhosis type ([facility] [address]    -  Primary ICD-10-CM: K74.60 ICD-9-CM: 571.5 Secondary esophageal varices with bleeding (HCC)     ICD-10-CM: I85.11 ICD-9-CM: 456.20 Chronic hepatitis C without hepatic coma (HCC)     ICD-10-CM: B18.2 ICD-9-CM: 070.54 Vitals BP Pulse Temp Weight(growth percentile) SpO2 BMI  
 133/80 (BP 1 Location: Left arm, BP Patient Position: Sitting) 98 98.4 °F (36.9 °C) (Tympanic) 224 lb (101.6 kg) 95% 32.14 kg/m2 Smoking Status Current Every Day Smoker Vitals History BMI and BSA Data Body Mass Index Body Surface Area  
 32.14 kg/m 2 2.24 m 2 Preferred Pharmacy Pharmacy Name Phone [address] [place] [address], [address] [place] [address] Krysta Fox Said [phone] Your Updated Medication List  
  
   
This list is accurate as of 4/18/18  3:36 PM.  Always use your most recent med list. amLODIPine 5 mg tablet Commonly known as:  Caralee Dupes Take 5 mg by mouth daily. lisinopril 40 mg tablet Commonly known as:  Marathon Rey Take 40 mg by mouth daily. multivitamin tablet Commonly known as:  ONE A DAY Take 1 Tab by mouth daily. We Performed the Following AFP WITH AFP-L3% [[id number] Custom] CBC W/O DIFF [06060 CPT(R)] HCV RNA BY COLLETTE QL,RFLX TO QT [97979 CPT(R)] METABOLIC PANEL, COMPREHENSIVE [07033 CPT(R)] Follow-up Instructions Return in about 6 months (around 10/18/2018). To-Do List   
 04/18/2018 Imaging:  [facility]! Dear Charlee Meckel: Thank you for requesting a bettermarks account. Our records indicate that you already have an active bettermarks account. You can access your account anytime at [email or web address] Did you know that you can access your hospital and ER discharge instructions at any time in bettermarks? You can also review all of your test results from your hospital stay or ER visit. Additional Information If you have questions, please visit the Frequently Asked Questions section of the bettermarks website at [email or web address] Remember, bettermarks is NOT to be used for urgent needs. For medical emergencies, dial 911. Now available from your iPhone and Android! Please provide this summary of care documentation to your next provider. Your primary care clinician is listed as Sea Sorto. If you have any questions after today's visit, please call [phone].

## 2018-04-18 NOTE — PROGRESS NOTES
1. Have you been to the ER, urgent care clinic since your last visit? Hospitalized since your last visit? No    2. Have you seen or consulted any other health care providers outside of the [address] [place] [address] since your last visit? Include any pap smears or colon screening. No   Chief Complaint   Patient presents with    Follow-up     Visit Vitals    /80 (BP 1 Location: Left arm, BP Patient Position: Sitting)    Pulse 98    Temp 98.4 °F (36.9 °C) (Tympanic)    Wt 224 lb (101.6 kg)    SpO2 95%    BMI 32.14 kg/m2     PHQ over the last two weeks 4/18/2018   Little interest or pleasure in doing things Not at all   Feeling down, depressed or hopeless Not at all   Total Score PHQ 2 0     Learning Assessment 4/18/2018   PRIMARY LEARNER Patient   BARRIERS PRIMARY LEARNER NONE   CO-LEARNER CAREGIVER No   PRIMARY LANGUAGE ENGLISH   LEARNER PREFERENCE PRIMARY LISTENING   ANSWERED BY patient    RELATIONSHIP SELF     Abuse Screening Questionnaire 4/18/2018   Do you ever feel afraid of your partner? N   Are you in a relationship with someone who physically or mentally threatens you? N   Is it safe for you to go home?  Anson Lara

## 2018-04-19 LAB
AFP L3 MFR SERPL: 6.2 % (ref 0–9.9)
AFP SERPL-MCNC: 5.1 NG/ML (ref 0–8)
ALBUMIN SERPL-MCNC: 4.2 G/DL (ref 3.5–5.5)
ALBUMIN/GLOB SERPL: 1.1 {RATIO} (ref 1.2–2.2)
ALP SERPL-CCNC: 88 IU/L (ref 39–117)
ALT SERPL-CCNC: 21 IU/L (ref 0–44)
AST SERPL-CCNC: 22 IU/L (ref 0–40)
BILIRUB SERPL-MCNC: 0.6 MG/DL (ref 0–1.2)
BUN SERPL-MCNC: 13 MG/DL (ref 6–24)
BUN/CREAT SERPL: 18 (ref 9–20)
CALCIUM SERPL-MCNC: 9.2 MG/DL (ref 8.7–10.2)
CHLORIDE SERPL-SCNC: 102 MMOL/L (ref 96–106)
CO2 SERPL-SCNC: 23 MMOL/L (ref 18–29)
CREAT SERPL-MCNC: 0.74 MG/DL (ref 0.76–1.27)
ERYTHROCYTE [DISTWIDTH] IN BLOOD BY AUTOMATED COUNT: 14.8 % (ref 12.3–15.4)
GFR SERPLBLD CREATININE-BSD FMLA CKD-EPI: 103 ML/MIN/1.73
GFR SERPLBLD CREATININE-BSD FMLA CKD-EPI: 119 ML/MIN/1.73
GLOBULIN SER CALC-MCNC: 3.7 G/DL (ref 1.5–4.5)
GLUCOSE SERPL-MCNC: 89 MG/DL (ref 65–99)
HCT VFR BLD AUTO: 40.7 % (ref 37.5–51)
HGB BLD-MCNC: 14 G/DL (ref 13–17.7)
MCH RBC QN AUTO: 30.1 PG (ref 26.6–33)
MCHC RBC AUTO-ENTMCNC: 34.4 G/DL (ref 31.5–35.7)
MCV RBC AUTO: 88 FL (ref 79–97)
PLATELET # BLD AUTO: 142 X10E3/UL (ref 150–379)
POTASSIUM SERPL-SCNC: 4.5 MMOL/L (ref 3.5–5.2)
PROT SERPL-MCNC: 7.9 G/DL (ref 6–8.5)
RBC # BLD AUTO: 4.65 X10E6/UL (ref 4.14–5.8)
SODIUM SERPL-SCNC: 142 MMOL/L (ref 134–144)
WBC # BLD AUTO: 8.3 X10E3/UL (ref 3.4–10.8)

## 2018-04-21 LAB — HCV RNA SERPL QL NAA+PROBE: NEGATIVE

## 2020-02-06 ENCOUNTER — OFFICE VISIT (OUTPATIENT)
Dept: INTERNAL MEDICINE CLINIC | Age: 60
End: 2020-02-06

## 2020-02-06 VITALS
SYSTOLIC BLOOD PRESSURE: 123 MMHG | WEIGHT: 233.8 LBS | OXYGEN SATURATION: 98 % | HEART RATE: 91 BPM | TEMPERATURE: 98 F | RESPIRATION RATE: 16 BRPM | BODY MASS INDEX: 35.43 KG/M2 | DIASTOLIC BLOOD PRESSURE: 79 MMHG | HEIGHT: 68 IN

## 2020-02-06 DIAGNOSIS — I10 ESSENTIAL HYPERTENSION: Primary | ICD-10-CM

## 2020-02-06 DIAGNOSIS — B18.2 CHRONIC HEPATITIS C WITHOUT HEPATIC COMA (HCC): ICD-10-CM

## 2020-02-06 DIAGNOSIS — E66.01 SEVERE OBESITY (HCC): ICD-10-CM

## 2020-02-06 DIAGNOSIS — Z23 ENCOUNTER FOR IMMUNIZATION: ICD-10-CM

## 2020-02-06 DIAGNOSIS — K74.60 CIRRHOSIS OF LIVER WITHOUT ASCITES, UNSPECIFIED HEPATIC CIRRHOSIS TYPE (HCC): ICD-10-CM

## 2020-02-06 DIAGNOSIS — C21.0 ANAL CANCER (HCC): ICD-10-CM

## 2020-02-06 RX ORDER — AMLODIPINE BESYLATE 5 MG/1
5 TABLET ORAL DAILY
Qty: 90 TAB | Refills: 3 | Status: SHIPPED | OUTPATIENT
Start: 2020-02-06 | End: 2021-03-16 | Stop reason: SDUPTHER

## 2020-02-06 RX ORDER — LISINOPRIL 40 MG/1
40 TABLET ORAL DAILY
Qty: 90 TAB | Refills: 3 | Status: SHIPPED | OUTPATIENT
Start: 2020-02-06 | End: 2021-03-16 | Stop reason: SDUPTHER

## 2020-02-06 NOTE — PATIENT INSTRUCTIONS
Office Policies    Phone calls/patient messages:            Please allow up to 24 hours for someone in the office to contact you about your call or message. Be mindful your provider may be out of the office or your message may require further review. We encourage you to use [employer] for your messages as this is a faster, more efficient way to communicate with our office                         Medication Refills:            Prescription medications require 48-72 business hours to process. We encourage you to use [employer] for your refills. For controlled medications: Please allow 72 business hours to process. Certain medications may require you to  a written prescription at our office. NO narcotic/controlled medications will be prescribed after 4pm Monday through Friday or on weekends              Form/Paperwork Completion:            Please note a $25 fee may incur for all paperwork for completed by our providers. We ask that you allow 7-10 business days. Pre-payment is due prior to picking up/faxing the completed form. You may also download your forms to [employer] to have your doctor print off. DASH Diet: Care Instructions  Your Care Instructions    The DASH diet is an eating plan that can help lower your blood pressure. DASH stands for Dietary Approaches to Stop Hypertension. Hypertension is high blood pressure. The DASH diet focuses on eating foods that are high in calcium, potassium, and magnesium. These nutrients can lower blood pressure. The foods that are highest in these nutrients are fruits, vegetables, low-fat dairy products, nuts, seeds, and legumes. But taking calcium, potassium, and magnesium supplements instead of eating foods that are high in those nutrients does not have the same effect. The DASH diet also includes whole grains, fish, and poultry. The DASH diet is one of several lifestyle changes your doctor may recommend to lower your high blood pressure.  Your doctor may also want you to decrease the amount of sodium in your diet. Lowering sodium while following the DASH diet can lower blood pressure even further than just the DASH diet alone. Follow-up care is a key part of your treatment and safety. Be sure to make and go to all appointments, and call your doctor if you are having problems. It's also a good idea to know your test results and keep a list of the medicines you take. How can you care for yourself at home? Following the DASH diet  · Eat 4 to 5 servings of fruit each day. A serving is 1 medium-sized piece of fruit, ½ cup chopped or canned fruit, 1/4 cup dried fruit, or 4 ounces (½ cup) of fruit juice. Choose fruit more often than fruit juice. · Eat 4 to 5 servings of vegetables each day. A serving is 1 cup of lettuce or raw leafy vegetables, ½ cup of chopped or cooked vegetables, or 4 ounces (½ cup) of vegetable juice. Choose vegetables more often than vegetable juice. · Get 2 to 3 servings of low-fat and fat-free dairy each day. A serving is 8 ounces of milk, 1 cup of yogurt, or 1 ½ ounces of cheese. · Eat 6 to 8 servings of grains each day. A serving is 1 slice of bread, 1 ounce of dry cereal, or ½ cup of cooked rice, pasta, or cooked cereal. Try to choose whole-grain products as much as possible. · Limit lean meat, poultry, and fish to 2 servings each day. A serving is 3 ounces, about the size of a deck of cards. · Eat 4 to 5 servings of nuts, seeds, and legumes (cooked dried beans, lentils, and split peas) each week. A serving is 1/3 cup of nuts, 2 tablespoons of seeds, or ½ cup of cooked beans or peas. · Limit fats and oils to 2 to 3 servings each day. A serving is 1 teaspoon of vegetable oil or 2 tablespoons of salad dressing. · Limit sweets and added sugars to 5 servings or less a week. A serving is 1 tablespoon jelly or jam, ½ cup sorbet, or 1 cup of lemonade. · Eat less than 2,300 milligrams (mg) of sodium a day.  If you limit your sodium to 1,500 mg a day, you can lower your blood pressure even more. Tips for success  · Start small. Do not try to make dramatic changes to your diet all at once. You might feel that you are missing out on your favorite foods and then be more likely to not follow the plan. Make small changes, and stick with them. Once those changes become habit, add a few more changes. · Try some of the following:  ? Make it a goal to eat a fruit or vegetable at every meal and at snacks. This will make it easy to get the recommended amount of fruits and vegetables each day. ? Try yogurt topped with fruit and nuts for a snack or healthy dessert. ? Add lettuce, tomato, cucumber, and onion to sandwiches. ? Combine a ready-made pizza crust with low-fat mozzarella cheese and lots of vegetable toppings. Try using tomatoes, squash, spinach, broccoli, carrots, cauliflower, and onions. ? Have a variety of cut-up vegetables with a low-fat dip as an appetizer instead of chips and dip. ? Sprinkle sunflower seeds or chopped almonds over salads. Or try adding chopped walnuts or almonds to cooked vegetables. ? Try some vegetarian meals using beans and peas. Add garbanzo or kidney beans to salads. Make burritos and tacos with mashed lobo beans or black beans. Where can you learn more? Go to http://jennifer-melody.info/. Enter K036 in the search box to learn more about \"DASH Diet: Care Instructions. \"  Current as of: April 9, 2019  Content Version: 12.2  © 3161-1569 [employer]. Care instructions adapted under license by [employer] (which disclaims liability or warranty for this information). If you have questions about a medical condition or this instruction, always ask your healthcare professional. [address] any warranty or liability for your use of this information. Will do all of your labs at next visit, so try to get a morning appt and be fasting.

## 2020-02-06 NOTE — PROGRESS NOTES
Anand Rhodes is a 63 y.o. male who presents for evaluation of npv. Used to see dr Dona Edgar, then saw dr Margot daniels, last saw her in nov or so. Overall doing well now, but needs refills for norvasc and lisinopril.       ROS:  Constitutional: negative for fevers, chills, anorexia and weight loss  Eyes:   negative for visual disturbance and irritation  ENT:   negative for tinnitus,sore throat,nasal congestion,ear pain,hoarseness  Respiratory:  negative for cough, hemoptysis, dyspnea,wheezing  CV:   negative for chest pain, palpitations, lower extremity edema  GI:   negative for nausea, vomiting, diarrhea, abdominal pain,melena  Genitourinary: negative for frequency, dysuria and hematuria  Musculoskel: negative for myalgias, arthralgias, back pain, muscle weakness, joint pain  Neurological:  negative for headaches, dizziness, focal weakness, numbness  Psychiatric:     Negative for depression or anxiety      Past Medical History:   Diagnosis Date    [facility] 2011    rectal cancer     Hepatitis C dx July 2016    Hypertension        Past Surgical History:   Procedure Laterality Date    HX ENDOSCOPY  07/2016    NEUROLOGICAL PROCEDURE UNLISTED      L4/L5 hemilaminectomy/microdisectomy       Family History   Problem Relation Age of Onset    Cancer Brother         colon cancer    Hypertension Mother     Diabetes Father        Social History     Socioeconomic History    Marital status:      Spouse name: Not on file    Number of children: Not on file    Years of education: Not on file    Highest education level: Not on file   Occupational History    Not on file   Social Needs    Financial resource strain: Not on file    Food insecurity:     Worry: Not on file     Inability: Not on file    Transportation needs:     Medical: Not on file     Non-medical: Not on file   Tobacco Use    Smoking status: Former Smoker     Packs/day: 0.50     Years: 25.00     Pack years: 12.50     Last attempt to quit: 10/1/2018     Years since quittin.3    Smokeless tobacco: Never Used   Substance and Sexual Activity    Alcohol use: Yes     Comment: occassionally    Drug use: No    Sexual activity: Yes     Partners: Female   Lifestyle    Physical activity:     Days per week: Not on file     Minutes per session: Not on file    Stress: Not on file   Relationships    Social connections:     Talks on phone: Not on file     Gets together: Not on file     Attends Zoroastrian service: Not on file     Active member of club or organization: Not on file     Attends meetings of clubs or organizations: Not on file     Relationship status: Not on file    Intimate partner violence:     Fear of current or ex partner: Not on file     Emotionally abused: Not on file     Physically abused: Not on file     Forced sexual activity: Not on file   Other Topics Concern    Not on file   Social History Narrative    Not on file            Visit Vitals  /79 (BP 1 Location: Left arm, BP Patient Position: Sitting)   Pulse 91   Temp 98 °F (36.7 °C) (Oral)   Resp 16   Ht 5' 8.25\" (1.734 m)   Wt 233 lb 12.8 oz (106.1 kg)   SpO2 98%   BMI 35.29 kg/m²       Physical Examination:   General - Well appearing male  HEENT - PERRL, TM no erythema/opacification, normal nasal turbinates, no oropharyngeal erythema or exudate, MMM  Neck - supple, no bruits, no thyroidomegaly, no lymphadenopathy  Pulm - clear to auscultation bilaterally  Cardio - RRR, normal S1 S2, no murmur  Abd - soft, nontender, no masses, no HSM  Extrem - no edema, +2 distal pulses  Neuro-  No focal deficits, CN intact     Assessment/Plan:    1.  htn--controlled with norvasc and lisinopril  2.  hcv with cirrhosis--treated and cured. No signs of any ascites  3. Hx anal cancer--cured with radiation and chemo. Dr John Damon  4. Hx esophageal varices--banded. tdap and pneumovax 23 both given today. 1st hep B given today as well. rtc 6 months for cpe and labs.         Aaliyah B Chuy GOMEZ, DO

## 2020-02-06 NOTE — PROGRESS NOTES
Reviewed record in preparation for visit and have obtained necessary documentation. Identified pt with two pt identifiers(name and ). Chief Complaint   Patient presents with   [address] Maintenance Due   Topic Date Due    Pneumococcal 0-64 years (1 of 1 - PPSV23) 11/10/1967    DTaP/Tdap/Td series (1 - Tdap) 11/10/1972    Shingrix Vaccine Age 50> (1 of 2) 11/10/2011    FOBT Q1Y Age 50-75  11/10/2011    Lipid Screen  2018    Influenza Age 9 to Adult  2019       Mr. Kike Forbes has a reminder for a \"due or due soon\" health maintenance. I have asked that he discuss health maintenance topic(s) due with His  primary care provider. Coordination of Care Questionnaire:  :     1) Have you been to an emergency room, urgent care clinic since your last visit? no   Hospitalized since your last visit? no             2) Have you seen or consulted any other health care providers outside of [address] [place] [address] since your last visit? no  (Include any pap smears or colon screenings in this section.)    3) Do you have an Advance Directive on file? yes    4) Are you interested in receiving information on Advance Directives? NO    Patient is accompanied by self I have received verbal consent from Murrel Dancer to discuss any/all medical information while they are present in the room.

## 2020-03-09 ENCOUNTER — CLINICAL SUPPORT (OUTPATIENT)
Dept: INTERNAL MEDICINE CLINIC | Age: 60
End: 2020-03-09

## 2020-03-09 VITALS — TEMPERATURE: 97.7 F

## 2020-03-09 DIAGNOSIS — Z23 ENCOUNTER FOR IMMUNIZATION: Primary | ICD-10-CM

## 2021-03-16 RX ORDER — LISINOPRIL 40 MG/1
40 TABLET ORAL DAILY
Qty: 90 TAB | Refills: 0 | Status: SHIPPED | OUTPATIENT
Start: 2021-03-16 | End: 2021-03-30

## 2021-03-16 RX ORDER — AMLODIPINE BESYLATE 5 MG/1
5 TABLET ORAL DAILY
Qty: 90 TAB | Refills: 0 | Status: SHIPPED | OUTPATIENT
Start: 2021-03-16 | End: 2021-03-30

## 2021-05-20 ENCOUNTER — OFFICE VISIT (OUTPATIENT)
Dept: INTERNAL MEDICINE CLINIC | Age: 61
End: 2021-05-20
Payer: COMMERCIAL

## 2021-05-20 VITALS
TEMPERATURE: 99.5 F | HEIGHT: 68 IN | RESPIRATION RATE: 14 BRPM | OXYGEN SATURATION: 95 % | BODY MASS INDEX: 33.07 KG/M2 | HEART RATE: 88 BPM | SYSTOLIC BLOOD PRESSURE: 117 MMHG | DIASTOLIC BLOOD PRESSURE: 72 MMHG | WEIGHT: 218.2 LBS

## 2021-05-20 DIAGNOSIS — B18.2 CHRONIC HEPATITIS C WITHOUT HEPATIC COMA (HCC): ICD-10-CM

## 2021-05-20 DIAGNOSIS — Z00.00 ANNUAL PHYSICAL EXAM: Primary | ICD-10-CM

## 2021-05-20 DIAGNOSIS — I10 ESSENTIAL HYPERTENSION: ICD-10-CM

## 2021-05-20 DIAGNOSIS — R73.03 PREDIABETES: ICD-10-CM

## 2021-05-20 DIAGNOSIS — C21.0 ANAL CANCER (HCC): ICD-10-CM

## 2021-05-20 DIAGNOSIS — E66.01 SEVERE OBESITY (HCC): ICD-10-CM

## 2021-05-20 DIAGNOSIS — Z12.11 SCREEN FOR COLON CANCER: ICD-10-CM

## 2021-05-20 PROCEDURE — 99396 PREV VISIT EST AGE 40-64: CPT | Performed by: INTERNAL MEDICINE

## 2021-05-20 NOTE — PATIENT INSTRUCTIONS
Well Visit, Men 48 to 72: Care Instructions  Overview     Well visits can help you stay healthy. Your doctor has checked your overall health and may have suggested ways to take good care of yourself. Your doctor also may have recommended tests. At home, you can help prevent illness with healthy eating, regular exercise, and other steps. Follow-up care is a key part of your treatment and safety. Be sure to make and go to all appointments, and call your doctor if you are having problems. It's also a good idea to know your test results and keep a list of the medicines you take. How can you care for yourself at home? · Get screening tests that you and your doctor decide on. Screening helps find diseases before any symptoms appear. · Eat healthy foods. Choose fruits, vegetables, whole grains, protein, and low-fat dairy foods. Limit fat, especially saturated fat. Reduce salt in your diet. · Limit alcohol. Have no more than 2 drinks a day or 14 drinks a week. · Get at least 30 minutes of exercise on most days of the week. Walking is a good choice. You also may want to do other activities, such as running, swimming, cycling, or playing tennis or team sports. · Reach and stay at a healthy weight. This will lower your risk for many problems, such as obesity, diabetes, heart disease, and high blood pressure. · Do not smoke. Smoking can make health problems worse. If you need help quitting, talk to your doctor about stop-smoking programs and medicines. These can increase your chances of quitting for good. · Care for your mental health. It is easy to get weighed down by worry and stress. Learn strategies to manage stress, like deep breathing and mindfulness, and stay connected with your family and community. If you find you often feel sad or hopeless, talk with your doctor. Treatment can help. · Talk to your doctor about whether you have any risk factors for sexually transmitted infections (STIs).  You can help prevent STIs if you wait to have sex with a new partner (or partners) until you've each been tested for STIs. It also helps if you use condoms (male or female condoms) and if you limit your sex partners to one person who only has sex with you. Vaccines are available for some STIs. · If it's important to you to prevent pregnancy with your partner, talk with your doctor about birth control options that might be best for you. · If you think you may have a problem with alcohol or drug use, talk to your doctor. This includes prescription medicines (such as amphetamines and opioids) and illegal drugs (such as cocaine and methamphetamine). Your doctor can help you figure out what type of treatment is best for you. · Protect your skin from too much sun. When you're outdoors from 10 a.m. to 4 p.m., stay in the shade or cover up with clothing and a hat with a wide brim. Wear sunglasses that block UV rays. Even when it's cloudy, put broad-spectrum sunscreen (SPF 30 or higher) on any exposed skin. · See a dentist one or two times a year for checkups and to have your teeth cleaned. · Wear a seat belt in the car. When should you call for help? Watch closely for changes in your health, and be sure to contact your doctor if you have any problems or symptoms that concern you. Where can you learn more? Go to http://www.gray.com/  Enter Q841 in the search box to learn more about \"Well Visit, Men 48 to 72: Care Instructions. \"  Current as of: May 27, 2020               Content Version: 12.8  © 1794-0520 [employer]. Care instructions adapted under license by [employer] (which disclaims liability or warranty for this information). If you have questions about a medical condition or this instruction, always ask your healthcare professional. [address] any warranty or liability for your use of this information.     If you can get your weight down under 200, then I can practically promise you that we will be able to stop your norvasc. Get fasting labs done. Nothing to eat after MN, but may drink water.

## 2021-05-20 NOTE — PROGRESS NOTES
Arslan Raphael is a 62 y.o. male who presents for evaluation of annual cpe. Last seen by me 2020 in [facility]. He has done well since then. Weight down 15 lbs  He walks at least 2 miles every day, and on weekends up to 5 miles. Tends to like sweets though, as well as a few beers.       ROS:  Constitutional: negative for fevers, chills, anorexia and weight loss  Eyes:   negative for visual disturbance and irritation  ENT:   negative for tinnitus,sore throat,nasal congestion,ear pain,hoarseness  Respiratory:  negative for cough, hemoptysis, dyspnea,wheezing  CV:   negative for chest pain, palpitations, lower extremity edema  GI:   negative for nausea, vomiting, diarrhea, abdominal pain,melena  Genitourinary: negative for frequency, dysuria and hematuria  Musculoskel: negative for myalgias, arthralgias, back pain, muscle weakness, joint pain  Neurological:  negative for headaches, dizziness, focal weakness, numbness  Psychiatric:     Negative for depression or anxiety      Past Medical History:   Diagnosis Date    Cancer ([facility] [address]     rectal cancer     Hepatitis C dx 2016    Hypertension        Past Surgical History:   Procedure Laterality Date    HX ENDOSCOPY  2016    NEUROLOGICAL PROCEDURE UNLISTED      L4/L5 hemilaminectomy/microdisectomy       Family History   Problem Relation Age of Onset    Cancer Brother         colon cancer    Hypertension Mother     Diabetes Father        Social History     Socioeconomic History    Marital status:      Spouse name: Not on file    Number of children: Not on file    Years of education: Not on file    Highest education level: Not on file   Occupational History    Not on file   Tobacco Use    Smoking status: Former Smoker     Packs/day: 0.50     Years: 25.00     Pack years: 12.50     Quit date: 10/1/2018     Years since quittin.6    Smokeless tobacco: Never Used   Substance and Sexual Activity    Alcohol use: Yes     Comment: occassionally    Drug use: No    Sexual activity: Yes     Partners: Female   Other Topics Concern    Not on file   Social History Narrative    Not on file     Social Determinants of Health     Financial Resource Strain:     Difficulty of Paying Living Expenses:    Food Insecurity:     Worried About Running Out of Food in the Last Year:     [address] in the Last Year:    Transportation Needs:     Lack of Transportation (Medical):  Lack of Transportation (Non-Medical):    Physical Activity:     Days of Exercise per Week:     Minutes of Exercise per Session:    Stress:     Feeling of Stress :    Social Connections:     Frequency of Communication with Friends and Family:     Frequency of Social Gatherings with Friends and Family:     Attends Gnosticist Services:     Active Member of Clubs or Organizations:     Attends Club or Organization Meetings:     Marital Status:    Intimate Partner Violence:     Fear of Current or Ex-Partner:     Emotionally Abused:     Physically Abused:     Sexually Abused:             Visit Vitals  /72 (BP 1 Location: Left upper arm, BP Patient Position: Sitting)   Pulse 88   Temp 99.5 °F (37.5 °C) (Temporal)   Resp 14   Ht 5' 8.25\" (1.734 m)   Wt 218 lb 3.2 oz (99 kg)   SpO2 95%   BMI 32.93 kg/m²       Physical Examination:   General - Well appearing male  HEENT - PERRL, TM no erythema/opacification, normal nasal turbinates, no oropharyngeal erythema or exudate, MMM  Neck - supple, no bruits, no thyroidomegaly, no lymphadenopathy  Pulm - clear to auscultation bilaterally  Cardio - RRR, normal S1 S2, no murmur  Abd - soft, nontender, no masses, no HSM  Extrem - no edema, +2 distal pulses  Neuro-  No focal deficits, CN intact     Assessment/Plan:    1. Annual cpe--check cbc, cmp, flp, tsh, a1c, psa, vzv  2. htn--controlled with norvasc, lisinopril. Told him if he can get weight under 200, then good chance can stop norvasc.   3.   Hx hcv--treated and cured  4. Hx anal cancer--sp radiation and chemo. Has colonoscopy upcoming  5. Hx esophageal varices--banded, no further issues. 6.  Obesity, bmi 32.93--making good progress    rtc one year, sooner if labs abn.         Jesus Griggs III, DO

## 2021-10-08 ENCOUNTER — CLINICAL SUPPORT (OUTPATIENT)
Dept: INTERNAL MEDICINE CLINIC | Age: 61
End: 2021-10-08
Payer: COMMERCIAL

## 2021-10-08 DIAGNOSIS — Z23 NEEDS FLU SHOT: ICD-10-CM

## 2021-10-08 DIAGNOSIS — Z23 ENCOUNTER FOR IMMUNIZATION: ICD-10-CM

## 2021-10-08 PROCEDURE — 90471 IMMUNIZATION ADMIN: CPT | Performed by: INTERNAL MEDICINE

## 2021-10-08 PROCEDURE — 90686 IIV4 VACC NO PRSV 0.5 ML IM: CPT | Performed by: INTERNAL MEDICINE

## 2021-10-08 PROCEDURE — 90472 IMMUNIZATION ADMIN EACH ADD: CPT | Performed by: INTERNAL MEDICINE

## 2021-10-08 PROCEDURE — 90750 HZV VACC RECOMBINANT IM: CPT | Performed by: INTERNAL MEDICINE

## 2021-10-08 NOTE — PROGRESS NOTES
Rhea Correa is a 62 y.o. male who presents for routine immunizations. He denies any symptoms , reactions or allergies that would exclude them from being immunized today. Risks and adverse reactions were discussed and the VIS was given to them. All questions were addressed. He was observed for 10 min post injection. There were no reactions observed.     Michael Melvin

## 2022-01-14 ENCOUNTER — CLINICAL SUPPORT (OUTPATIENT)
Dept: INTERNAL MEDICINE CLINIC | Age: 62
End: 2022-01-14
Payer: COMMERCIAL

## 2022-01-14 VITALS — HEIGHT: 68 IN | TEMPERATURE: 97.7 F | WEIGHT: 218 LBS | BODY MASS INDEX: 33.04 KG/M2

## 2022-01-14 DIAGNOSIS — Z23 ENCOUNTER FOR IMMUNIZATION: Primary | ICD-10-CM

## 2022-01-14 PROCEDURE — 90471 IMMUNIZATION ADMIN: CPT | Performed by: INTERNAL MEDICINE

## 2022-01-14 PROCEDURE — 90750 HZV VACC RECOMBINANT IM: CPT | Performed by: INTERNAL MEDICINE

## 2022-03-19 PROBLEM — E66.01 SEVERE OBESITY (HCC): Status: ACTIVE | Noted: 2020-02-06

## 2022-03-19 PROBLEM — K74.60 CIRRHOSIS OF LIVER WITHOUT ASCITES (HCC): Status: ACTIVE | Noted: 2017-09-06

## 2022-06-27 RX ORDER — LISINOPRIL 40 MG/1
TABLET ORAL
Qty: 90 TABLET | Refills: 3 | OUTPATIENT
Start: 2022-06-27

## 2022-06-27 RX ORDER — AMLODIPINE BESYLATE 5 MG/1
TABLET ORAL
Qty: 90 TABLET | Refills: 3 | OUTPATIENT
Start: 2022-06-27

## 2022-06-28 RX ORDER — AMLODIPINE BESYLATE 5 MG/1
TABLET ORAL
Qty: 90 TABLET | Refills: 0 | Status: SHIPPED | OUTPATIENT
Start: 2022-06-28 | End: 2022-10-03

## 2022-06-28 RX ORDER — LISINOPRIL 40 MG/1
TABLET ORAL
Qty: 90 TABLET | Refills: 0 | Status: SHIPPED | OUTPATIENT
Start: 2022-06-28 | End: 2022-10-03

## 2022-06-28 NOTE — TELEPHONE ENCOUNTER
Future Appointments:  Future Appointments   Date Time Provider Arminda Washburn   9/27/2022  3:30 PM Ismael Vera, DO [facility]        Last Appointment With Me:  Visit date not found     Requested Prescriptions     Pending Prescriptions Disp Refills    amLODIPine (NORVASC) 5 mg tablet 90 Tablet 3    lisinopriL (PRINIVIL, ZESTRIL) 40 mg tablet 90 Tablet 3     Refused Prescriptions Disp Refills    lisinopriL (PRINIVIL, ZESTRIL) 40 mg tablet [Pharmacy Med Name: LISINOPRIL 40MG TABS] 90 Tablet 3     Sig: TAKE 1 TABLET BY MOUTH ONE TIME A DAY     Refused By: Blaise DORADO III     Reason for Refusal: Appt required, please call patient    amLODIPine (NORVASC) 5 mg tablet [Pharmacy Med Name: AMLODIPINE BESYLATE 5MG TABS] 90 Tablet 3     Sig: TAKE 1 TABLET BY MOUTH ONE TIME A DAY     Refused By: Isidro Sorto     Reason for Refusal: Appt required, please call patient         Patient scheduled upcoming appointment in office, requesting to have medication filled, will run out while out of town. Sent to Dr. Maty Lawler.

## 2022-09-27 ENCOUNTER — OFFICE VISIT (OUTPATIENT)
Dept: INTERNAL MEDICINE CLINIC | Age: 62
End: 2022-09-27
Payer: COMMERCIAL

## 2022-09-27 VITALS
DIASTOLIC BLOOD PRESSURE: 80 MMHG | OXYGEN SATURATION: 95 % | BODY MASS INDEX: 33.95 KG/M2 | RESPIRATION RATE: 14 BRPM | TEMPERATURE: 98.3 F | SYSTOLIC BLOOD PRESSURE: 129 MMHG | WEIGHT: 224 LBS | HEIGHT: 68 IN | HEART RATE: 92 BPM

## 2022-09-27 DIAGNOSIS — R79.89 ABNORMAL TSH: ICD-10-CM

## 2022-09-27 DIAGNOSIS — E66.9 CLASS 1 OBESITY WITH SERIOUS COMORBIDITY AND BODY MASS INDEX (BMI) OF 34.0 TO 34.9 IN ADULT, UNSPECIFIED OBESITY TYPE: ICD-10-CM

## 2022-09-27 DIAGNOSIS — Z00.00 ANNUAL PHYSICAL EXAM: Primary | ICD-10-CM

## 2022-09-27 DIAGNOSIS — B18.2 CHRONIC HEPATITIS C WITHOUT HEPATIC COMA (HCC): ICD-10-CM

## 2022-09-27 DIAGNOSIS — N40.0 BENIGN PROSTATIC HYPERPLASIA, UNSPECIFIED WHETHER LOWER URINARY TRACT SYMPTOMS PRESENT: ICD-10-CM

## 2022-09-27 DIAGNOSIS — I10 ESSENTIAL HYPERTENSION: ICD-10-CM

## 2022-09-27 DIAGNOSIS — Z23 NEEDS FLU SHOT: ICD-10-CM

## 2022-09-27 DIAGNOSIS — Z85.048 HISTORY OF ANAL CANCER: ICD-10-CM

## 2022-09-27 DIAGNOSIS — R73.03 PREDIABETES: ICD-10-CM

## 2022-09-27 PROCEDURE — 99396 PREV VISIT EST AGE 40-64: CPT | Performed by: INTERNAL MEDICINE

## 2022-09-27 PROCEDURE — 90471 IMMUNIZATION ADMIN: CPT

## 2022-09-27 PROCEDURE — 90686 IIV4 VACC NO PRSV 0.5 ML IM: CPT

## 2022-09-27 NOTE — PROGRESS NOTES
1. \"Have you been to the ER, urgent care clinic since your last visit? Hospitalized since your last visit? \" No    2. \"Have you seen or consulted any other health care providers outside of the [address] [place] [address] since your last visit? \" Yes dentist, teeth extraction      3. For patients aged 39-70: Has the patient had a colonoscopy / FIT/ Cologuard? Yes - Care Gap present. Rooming MA/LPN to request most recent results      If the patient is female:    4. For patients aged 41-77: Has the patient had a mammogram within the past 2 years? NA - based on age or sex      11. For patients aged 21-65: Has the patient had a pap smear?  NA - based on age or sex

## 2022-09-27 NOTE — PROGRESS NOTES
Rhea Correa is a 62 y.o. male who presents for evaluation of annual cpe. Last seen by me may 20, 2021 in cpe. He has done well since then. Walks 2+ miles every lunch time, but weight is up 6 lbs. He admits to snacking too much, and often late at night. Had colonoscopy with dr Clint Choudhary a few months ago. ROS:  Constitutional: negative for fevers, chills, anorexia and weight loss  Eyes:   negative for visual disturbance and irritation  ENT:   negative for tinnitus,sore throat,nasal congestion,ear pain,hoarseness  Respiratory:  negative for cough, hemoptysis, dyspnea,wheezing  CV:   negative for chest pain, palpitations, lower extremity edema  GI:   negative for nausea, vomiting, diarrhea, abdominal pain,melena  Genitourinary: negative for frequency, dysuria and hematuria  Musculoskel: negative for myalgias, arthralgias, back pain, muscle weakness, joint pain  Neurological:  negative for headaches, dizziness, focal weakness, numbness  Psychiatric:     Negative for depression or anxiety      Past Medical History:   Diagnosis Date    Cancer ([facility] [address] 2011    rectal cancer     Hepatitis C dx July 2016    Hypertension        Past Surgical History:   Procedure Laterality Date    HX ENDOSCOPY  07/2016    NEUROLOGICAL PROCEDURE UNLISTED      L4/L5 hemilaminectomy/microdisectomy       Family History   Problem Relation Age of Onset    Cancer Brother         colon cancer    Hypertension Mother     Diabetes Father        Social History     Socioeconomic History    Marital status:      Spouse name: Not on file    Number of children: Not on file    Years of education: Not on file    Highest education level: Not on file   Occupational History    Not on file   Tobacco Use    Smoking status: Former     Packs/day: 0.50     Years: 25.00     Pack years: 12.50     Types: Cigarettes     Quit date: 10/1/2018     Years since quitting: 3.9    Smokeless tobacco: Never   Substance and Sexual Activity    Alcohol use:  Yes Comment: occassionally    Drug use: No    Sexual activity: Yes     Partners: Female   Other Topics Concern    Not on file   Social History Narrative    Not on file     Social Determinants of Health     Financial Resource Strain: Low Risk     Difficulty of Paying Living Expenses: Not very hard   Food Insecurity: No Food Insecurity    Worried About Running Out of Food in the Last Year: Never true    Ran Out of Food in the Last Year: Never true   Transportation Needs: Not on file   Physical Activity: Not on file   Stress: Not on file   Social Connections: Not on file   Intimate Partner Violence: Not on file   Housing Stability: Not on file          Visit Vitals  BP (!) 143/79 (BP 1 Location: Left upper arm, BP Patient Position: Sitting)   Pulse 99   Temp 98.3 °F (36.8 °C) (Temporal)   Resp 16   Ht 5' 8\" (1.727 m)   Wt 224 lb (101.6 kg)   SpO2 96%   BMI 34.06 kg/m²       Physical Examination:   General - Well appearing male  HEENT - PERRL, TM no erythema/opacification, normal nasal turbinates, no oropharyngeal erythema or exudate, MMM  Neck - supple, no bruits, no thyroidomegaly, no lymphadenopathy  Pulm - clear to auscultation bilaterally  Cardio - RRR, normal S1 S2, no murmur  Abd - soft, nontender, no masses, no HSM  Extrem - no edema, +2 distal pulses  Neuro-  No focal deficits, CN intact     Assessment/Plan:     Annual cpe--check cbc, cmp, flp, tsh, a1c, psa  Htn--continue with norvasc, lisinopril. Recheck is at goal  Hx anal cancer--sp radiation and chemo. Had colon done a few months ago, good for 5 years  Hx hcv--treated and cured  Bph--check psa  Hx varices--banded, no recurrence    Flu shot given today.    Rtc one year, sooner if labs abn        Margene Memory III, DO

## 2022-09-30 ENCOUNTER — LAB ONLY (OUTPATIENT)
Dept: INTERNAL MEDICINE CLINIC | Age: 62
End: 2022-09-30

## 2022-09-30 DIAGNOSIS — I10 ESSENTIAL HYPERTENSION: ICD-10-CM

## 2022-09-30 DIAGNOSIS — R73.03 PREDIABETES: ICD-10-CM

## 2022-09-30 DIAGNOSIS — E66.9 CLASS 1 OBESITY WITH SERIOUS COMORBIDITY AND BODY MASS INDEX (BMI) OF 34.0 TO 34.9 IN ADULT, UNSPECIFIED OBESITY TYPE: ICD-10-CM

## 2022-09-30 DIAGNOSIS — R79.89 ABNORMAL TSH: ICD-10-CM

## 2022-09-30 DIAGNOSIS — Z00.00 ANNUAL PHYSICAL EXAM: ICD-10-CM

## 2022-09-30 DIAGNOSIS — N40.0 BENIGN PROSTATIC HYPERPLASIA, UNSPECIFIED WHETHER LOWER URINARY TRACT SYMPTOMS PRESENT: ICD-10-CM

## 2022-09-30 LAB
COMMENT, HOLDF: NORMAL
SAMPLES BEING HELD,HOLD: NORMAL

## 2022-10-01 LAB
ALBUMIN SERPL-MCNC: 4.2 G/DL (ref 3.5–5)
ALBUMIN/GLOB SERPL: 1.1 {RATIO} (ref 1.1–2.2)
ALP SERPL-CCNC: 87 U/L (ref 45–117)
ALT SERPL-CCNC: 50 U/L (ref 12–78)
ANION GAP SERPL CALC-SCNC: 3 MMOL/L (ref 5–15)
AST SERPL-CCNC: 30 U/L (ref 15–37)
BASOPHILS # BLD: 0.1 K/UL (ref 0–0.1)
BASOPHILS NFR BLD: 1 % (ref 0–1)
BILIRUB SERPL-MCNC: 0.5 MG/DL (ref 0.2–1)
BUN SERPL-MCNC: 23 MG/DL (ref 6–20)
BUN/CREAT SERPL: 23 (ref 12–20)
CALCIUM SERPL-MCNC: 9.5 MG/DL (ref 8.5–10.1)
CHLORIDE SERPL-SCNC: 108 MMOL/L (ref 97–108)
CHOLEST SERPL-MCNC: 188 MG/DL
CO2 SERPL-SCNC: 27 MMOL/L (ref 21–32)
CREAT SERPL-MCNC: 1.01 MG/DL (ref 0.7–1.3)
DIFFERENTIAL METHOD BLD: ABNORMAL
EOSINOPHIL # BLD: 0.2 K/UL (ref 0–0.4)
EOSINOPHIL NFR BLD: 2 % (ref 0–7)
ERYTHROCYTE [DISTWIDTH] IN BLOOD BY AUTOMATED COUNT: 12.9 % (ref 11.5–14.5)
EST. AVERAGE GLUCOSE BLD GHB EST-MCNC: 114 MG/DL
GLOBULIN SER CALC-MCNC: 3.9 G/DL (ref 2–4)
GLUCOSE SERPL-MCNC: 110 MG/DL (ref 65–100)
HBA1C MFR BLD: 5.6 % (ref 4–5.6)
HCT VFR BLD AUTO: 43.7 % (ref 36.6–50.3)
HDLC SERPL-MCNC: 53 MG/DL
HDLC SERPL: 3.5 {RATIO} (ref 0–5)
HGB BLD-MCNC: 14.6 G/DL (ref 12.1–17)
IMM GRANULOCYTES # BLD AUTO: 0 K/UL (ref 0–0.04)
IMM GRANULOCYTES NFR BLD AUTO: 0 % (ref 0–0.5)
LDLC SERPL CALC-MCNC: 113.4 MG/DL (ref 0–100)
LYMPHOCYTES # BLD: 1.1 K/UL (ref 0.8–3.5)
LYMPHOCYTES NFR BLD: 18 % (ref 12–49)
MCH RBC QN AUTO: 32.4 PG (ref 26–34)
MCHC RBC AUTO-ENTMCNC: 33.4 G/DL (ref 30–36.5)
MCV RBC AUTO: 97.1 FL (ref 80–99)
MONOCYTES # BLD: 0.7 K/UL (ref 0–1)
MONOCYTES NFR BLD: 10 % (ref 5–13)
NEUTS SEG # BLD: 4.4 K/UL (ref 1.8–8)
NEUTS SEG NFR BLD: 69 % (ref 32–75)
NRBC # BLD: 0 K/UL (ref 0–0.01)
NRBC BLD-RTO: 0 PER 100 WBC
PLATELET # BLD AUTO: 148 K/UL (ref 150–400)
PMV BLD AUTO: 12.4 FL (ref 8.9–12.9)
POTASSIUM SERPL-SCNC: 5.2 MMOL/L (ref 3.5–5.1)
PROT SERPL-MCNC: 8.1 G/DL (ref 6.4–8.2)
PSA SERPL-MCNC: 0.2 NG/ML (ref 0.01–4)
RBC # BLD AUTO: 4.5 M/UL (ref 4.1–5.7)
SODIUM SERPL-SCNC: 138 MMOL/L (ref 136–145)
T4 FREE SERPL-MCNC: 0.8 NG/DL (ref 0.8–1.5)
TRIGL SERPL-MCNC: 108 MG/DL (ref ?–150)
TSH SERPL DL<=0.05 MIU/L-ACNC: 5.5 UIU/ML (ref 0.36–3.74)
VLDLC SERPL CALC-MCNC: 21.6 MG/DL
WBC # BLD AUTO: 6.5 K/UL (ref 4.1–11.1)

## 2022-10-02 NOTE — PROGRESS NOTES
Labs stable, though thyroid trending toward being underactive. No need for meds yet, but heading in that direction. Stay active, stay well. Continue same meds.

## 2022-10-05 NOTE — PROGRESS NOTES
Second attempt to contact patient to discuss lab results. Results mailed 10/5/2022.     Prasanna Talbot CMA

## 2023-09-21 RX ORDER — LISINOPRIL 40 MG/1
TABLET ORAL
Qty: 90 TABLET | Refills: 3 | Status: SHIPPED | OUTPATIENT
Start: 2023-09-21

## 2023-09-21 RX ORDER — AMLODIPINE BESYLATE 5 MG/1
TABLET ORAL
Qty: 90 TABLET | Refills: 3 | Status: SHIPPED | OUTPATIENT
Start: 2023-09-21

## 2023-09-29 ENCOUNTER — OFFICE VISIT (OUTPATIENT)
Age: 63
End: 2023-09-29
Payer: COMMERCIAL

## 2023-09-29 VITALS
SYSTOLIC BLOOD PRESSURE: 130 MMHG | OXYGEN SATURATION: 99 % | BODY MASS INDEX: 34.86 KG/M2 | HEIGHT: 68 IN | TEMPERATURE: 98.4 F | DIASTOLIC BLOOD PRESSURE: 83 MMHG | RESPIRATION RATE: 20 BRPM | WEIGHT: 230 LBS | HEART RATE: 88 BPM

## 2023-09-29 DIAGNOSIS — E66.9 OBESITY (BMI 30-39.9): ICD-10-CM

## 2023-09-29 DIAGNOSIS — Z00.00 ANNUAL PHYSICAL EXAM: Primary | ICD-10-CM

## 2023-09-29 DIAGNOSIS — Z12.5 PROSTATE CANCER SCREENING: ICD-10-CM

## 2023-09-29 DIAGNOSIS — Z23 NEEDS FLU SHOT: ICD-10-CM

## 2023-09-29 DIAGNOSIS — I10 ESSENTIAL (PRIMARY) HYPERTENSION: ICD-10-CM

## 2023-09-29 DIAGNOSIS — B18.2 CHRONIC HEPATITIS C WITHOUT HEPATIC COMA (HCC): ICD-10-CM

## 2023-09-29 DIAGNOSIS — Z85.048 HISTORY OF MALIGNANT NEOPLASM OF ANUS: ICD-10-CM

## 2023-09-29 PROCEDURE — 99396 PREV VISIT EST AGE 40-64: CPT | Performed by: INTERNAL MEDICINE

## 2023-09-29 PROCEDURE — 90674 CCIIV4 VAC NO PRSV 0.5 ML IM: CPT | Performed by: INTERNAL MEDICINE

## 2023-09-29 PROCEDURE — 3075F SYST BP GE 130 - 139MM HG: CPT | Performed by: INTERNAL MEDICINE

## 2023-09-29 PROCEDURE — 90471 IMMUNIZATION ADMIN: CPT | Performed by: INTERNAL MEDICINE

## 2023-09-29 PROCEDURE — 3078F DIAST BP <80 MM HG: CPT | Performed by: INTERNAL MEDICINE

## 2023-09-29 RX ORDER — PHENTERMINE HYDROCHLORIDE 37.5 MG/1
37.5 TABLET ORAL
Qty: 30 TABLET | Refills: 3 | Status: SHIPPED | OUTPATIENT
Start: 2023-09-29 | End: 2024-01-27

## 2023-09-29 SDOH — ECONOMIC STABILITY: FOOD INSECURITY: WITHIN THE PAST 12 MONTHS, THE FOOD YOU BOUGHT JUST DIDN'T LAST AND YOU DIDN'T HAVE MONEY TO GET MORE.: NEVER TRUE

## 2023-09-29 SDOH — ECONOMIC STABILITY: FOOD INSECURITY: WITHIN THE PAST 12 MONTHS, YOU WORRIED THAT YOUR FOOD WOULD RUN OUT BEFORE YOU GOT MONEY TO BUY MORE.: NEVER TRUE

## 2023-09-29 SDOH — ECONOMIC STABILITY: HOUSING INSECURITY
IN THE LAST 12 MONTHS, WAS THERE A TIME WHEN YOU DID NOT HAVE A STEADY PLACE TO SLEEP OR SLEPT IN A SHELTER (INCLUDING NOW)?: NO

## 2023-09-29 SDOH — ECONOMIC STABILITY: INCOME INSECURITY: HOW HARD IS IT FOR YOU TO PAY FOR THE VERY BASICS LIKE FOOD, HOUSING, MEDICAL CARE, AND HEATING?: NOT HARD AT ALL

## 2023-09-29 ASSESSMENT — PATIENT HEALTH QUESTIONNAIRE - PHQ9
SUM OF ALL RESPONSES TO PHQ QUESTIONS 1-9: 0
1. LITTLE INTEREST OR PLEASURE IN DOING THINGS: 0
2. FEELING DOWN, DEPRESSED OR HOPELESS: 0
SUM OF ALL RESPONSES TO PHQ9 QUESTIONS 1 & 2: 0
SUM OF ALL RESPONSES TO PHQ QUESTIONS 1-9: 0

## 2023-09-29 NOTE — PROGRESS NOTES
1. \"Have you been to the ER, urgent care clinic since your last visit? Hospitalized since your last visit? \" No    2. \"Have you seen or consulted any other health care providers outside of the [address] [place] [address] since your last visit? \" No     3. For patients aged 43-73: Has the patient had a colonoscopy / FIT/ Cologuard? Yes - Care Gap present.  Most recent result on file

## 2023-09-29 NOTE — PROGRESS NOTES
Luis Islas is a 65 y.o. male who presents for evaluation of annual cpe. Last seen by me 2022 in cpe. Overall he has done well, though has gained another 6 lbs. Has not been monitoring bp at home, but wife can do it for him. Tries to walk for 45 minutes during lunch break on most days.       ROS:  Constitutional: negative for fevers, chills, anorexia and weight loss  Eyes:   negative for visual disturbance and irritation  ENT:   negative for tinnitus,sore throat,nasal congestion,ear pain,hoarseness  Respiratory:  negative for cough, hemoptysis, dyspnea,wheezing  CV:   negative for chest pain, palpitations, lower extremity edema  GI:   negative for nausea, vomiting, diarrhea, abdominal pain,melena  Genitourinary: negative for frequency, dysuria and hematuria  Musculoskel: negative for myalgias, arthralgias, back pain, muscle weakness, joint pain  Neurological:  negative for headaches, dizziness, focal weakness, numbness  Psychiatric:     Negative for depression or anxiety      Past Medical History:   Diagnosis Date    Cancer ([facility])     rectal cancer     Hepatitis C dx 2016    Hypertension        Past Surgical History:   Procedure Laterality Date    NEUROLOGICAL SURGERY      L4/L5 hemilaminectomy/microdisectomy    UPPER GASTROINTESTINAL ENDOSCOPY  2016       Family History   Problem Relation Age of Onset    Diabetes Father     Cancer Brother         colon cancer    Hypertension Mother        Social History     Socioeconomic History    Marital status:      Spouse name: Not on file    Number of children: Not on file    Years of education: Not on file    Highest education level: Not on file   Occupational History    Not on file   Tobacco Use    Smoking status: Former     Packs/day: 0.50     Years: 25.00     Additional pack years: 0.00     Total pack years: 12.50     Types: Cigarettes     Quit date: 10/1/2018     Years since quittin.9    Smokeless tobacco: Never   Substance and Sexual

## 2023-10-10 ENCOUNTER — NURSE ONLY (OUTPATIENT)
Age: 63
End: 2023-10-10

## 2023-10-10 DIAGNOSIS — Z12.5 PROSTATE CANCER SCREENING: ICD-10-CM

## 2023-10-10 DIAGNOSIS — B18.2 CHRONIC HEPATITIS C WITHOUT HEPATIC COMA (HCC): ICD-10-CM

## 2023-10-10 DIAGNOSIS — E66.9 OBESITY (BMI 30-39.9): ICD-10-CM

## 2023-10-10 DIAGNOSIS — Z00.00 ANNUAL PHYSICAL EXAM: ICD-10-CM

## 2023-10-11 LAB
AFP L3 MFR SERPL: NORMAL % (ref 0–9.9)
AFP SERPL-MCNC: 5.1 NG/ML (ref 0–8.4)
ALBUMIN SERPL-MCNC: 4.5 G/DL (ref 3.9–4.9)
ALBUMIN/GLOB SERPL: 1.5 {RATIO} (ref 1.2–2.2)
ALP SERPL-CCNC: 89 IU/L (ref 44–121)
ALT SERPL-CCNC: 63 IU/L (ref 0–44)
AST SERPL-CCNC: 55 IU/L (ref 0–40)
BASOPHILS # BLD AUTO: 0.1 X10E3/UL (ref 0–0.2)
BASOPHILS NFR BLD AUTO: 1 %
BILIRUB SERPL-MCNC: 1 MG/DL (ref 0–1.2)
BUN SERPL-MCNC: 20 MG/DL (ref 8–27)
BUN/CREAT SERPL: 20 (ref 10–24)
CALCIUM SERPL-MCNC: 10 MG/DL (ref 8.6–10.2)
CHLORIDE SERPL-SCNC: 100 MMOL/L (ref 96–106)
CHOLEST SERPL-MCNC: 184 MG/DL (ref 100–199)
CO2 SERPL-SCNC: 24 MMOL/L (ref 20–29)
CREAT SERPL-MCNC: 0.98 MG/DL (ref 0.76–1.27)
EGFRCR SERPLBLD CKD-EPI 2021: 88 ML/MIN/1.73
EOSINOPHIL # BLD AUTO: 0.2 X10E3/UL (ref 0–0.4)
EOSINOPHIL NFR BLD AUTO: 3 %
ERYTHROCYTE [DISTWIDTH] IN BLOOD BY AUTOMATED COUNT: 12.2 % (ref 11.6–15.4)
GLOBULIN SER CALC-MCNC: 3.1 G/DL (ref 1.5–4.5)
GLUCOSE SERPL-MCNC: 124 MG/DL (ref 70–99)
HBA1C MFR BLD: 5.5 % (ref 4.8–5.6)
HCT VFR BLD AUTO: 43.9 % (ref 37.5–51)
HDLC SERPL-MCNC: 63 MG/DL
HGB BLD-MCNC: 15 G/DL (ref 13–17.7)
IMM GRANULOCYTES # BLD AUTO: 0 X10E3/UL (ref 0–0.1)
IMM GRANULOCYTES NFR BLD AUTO: 0 %
LDLC SERPL CALC-MCNC: 110 MG/DL (ref 0–99)
LYMPHOCYTES # BLD AUTO: 1 X10E3/UL (ref 0.7–3.1)
LYMPHOCYTES NFR BLD AUTO: 20 %
MCH RBC QN AUTO: 32.9 PG (ref 26.6–33)
MCHC RBC AUTO-ENTMCNC: 34.2 G/DL (ref 31.5–35.7)
MCV RBC AUTO: 96 FL (ref 79–97)
MONOCYTES # BLD AUTO: 0.5 X10E3/UL (ref 0.1–0.9)
MONOCYTES NFR BLD AUTO: 10 %
NEUTROPHILS # BLD AUTO: 3.2 X10E3/UL (ref 1.4–7)
NEUTROPHILS NFR BLD AUTO: 66 %
PLATELET # BLD AUTO: 127 X10E3/UL (ref 150–450)
POTASSIUM SERPL-SCNC: 6.1 MMOL/L (ref 3.5–5.2)
PROT SERPL-MCNC: 7.6 G/DL (ref 6–8.5)
PSA SERPL-MCNC: 0.3 NG/ML (ref 0–4)
RBC # BLD AUTO: 4.56 X10E6/UL (ref 4.14–5.8)
SODIUM SERPL-SCNC: 136 MMOL/L (ref 134–144)
T4 FREE SERPL-MCNC: 1.07 NG/DL (ref 0.82–1.77)
TRIGL SERPL-MCNC: 60 MG/DL (ref 0–149)
TSH SERPL DL<=0.005 MIU/L-ACNC: 6.16 UIU/ML (ref 0.45–4.5)
VLDLC SERPL CALC-MCNC: 11 MG/DL (ref 5–40)
WBC # BLD AUTO: 5 X10E3/UL (ref 3.4–10.8)

## 2023-10-13 RX ORDER — AMLODIPINE BESYLATE 10 MG/1
10 TABLET ORAL DAILY
Qty: 90 TABLET | Refills: 3
Start: 2023-10-13

## 2023-12-21 ENCOUNTER — TELEPHONE (OUTPATIENT)
Age: 63
End: 2023-12-21

## 2023-12-21 RX ORDER — HYDROCHLOROTHIAZIDE 12.5 MG/1
12.5 CAPSULE, GELATIN COATED ORAL EVERY MORNING
Qty: 90 CAPSULE | Refills: 1 | Status: SHIPPED | OUTPATIENT
Start: 2023-12-21

## 2023-12-21 NOTE — TELEPHONE ENCOUNTER
Wife states that pt is in need of this medication and it was cancelled.      Pt is having swelling on just the amlodipine.  Legs swelled huge.     Pt is now taking both and swelling came down and pt is now compression socks.     Pt needs refill for Lisinopril to go to [facility] #[phone]      Wife states that potassium was high.  Pt had not been fasting for labs and had eaten a cantaloupe prior to.

## 2024-06-21 RX ORDER — HYDROCHLOROTHIAZIDE 12.5 MG/1
12.5 CAPSULE, GELATIN COATED ORAL EVERY MORNING
Qty: 90 CAPSULE | Refills: 1 | Status: SHIPPED | OUTPATIENT
Start: 2024-06-21

## 2024-06-25 ENCOUNTER — TELEPHONE (OUTPATIENT)
Age: 64
End: 2024-06-25

## 2024-06-25 RX ORDER — AMLODIPINE BESYLATE 10 MG/1
10 TABLET ORAL DAILY
Qty: 30 TABLET | Refills: 0 | Status: SHIPPED | OUTPATIENT
Start: 2024-06-25 | End: 2024-07-08 | Stop reason: SDUPTHER

## 2024-06-25 NOTE — TELEPHONE ENCOUNTER
Patient's Wife, Mariluz states she needs a call back in reference to appt for Dr. Garcia that was Scheduled for 7/8/24 @ 3:40 that is not showing on file. Please call to discuss. Please call Mariluz @ [phone]. Thank you

## 2024-06-25 NOTE — TELEPHONE ENCOUNTER
PCP: Daniel Garcia III,     Last appt: 9/29/2023  Future Appointments   Date Time Provider Department Center   7/8/2024  3:40 PM Daniel Garcia III,  [facility]       Requested Prescriptions     Pending Prescriptions Disp Refills    amLODIPine (NORVASC) 10 MG tablet 30 tablet 0     Sig: Take 1 tablet by mouth daily

## 2024-07-08 ENCOUNTER — OFFICE VISIT (OUTPATIENT)
Age: 64
End: 2024-07-08
Payer: COMMERCIAL

## 2024-07-08 VITALS
RESPIRATION RATE: 14 BRPM | WEIGHT: 240.4 LBS | HEART RATE: 93 BPM | OXYGEN SATURATION: 95 % | BODY MASS INDEX: 36.43 KG/M2 | HEIGHT: 68 IN | SYSTOLIC BLOOD PRESSURE: 153 MMHG | TEMPERATURE: 98 F | DIASTOLIC BLOOD PRESSURE: 81 MMHG

## 2024-07-08 DIAGNOSIS — Z85.048 HISTORY OF MALIGNANT NEOPLASM OF ANUS: ICD-10-CM

## 2024-07-08 DIAGNOSIS — R79.89 ELEVATED LFTS: ICD-10-CM

## 2024-07-08 DIAGNOSIS — B18.2 CHRONIC HEPATITIS C WITHOUT HEPATIC COMA (HCC): ICD-10-CM

## 2024-07-08 DIAGNOSIS — K74.60 CIRRHOSIS OF LIVER WITHOUT ASCITES, UNSPECIFIED HEPATIC CIRRHOSIS TYPE (HCC): ICD-10-CM

## 2024-07-08 DIAGNOSIS — E66.9 OBESITY (BMI 30-39.9): ICD-10-CM

## 2024-07-08 DIAGNOSIS — R79.89 ABNORMAL TSH: ICD-10-CM

## 2024-07-08 DIAGNOSIS — I10 ESSENTIAL HYPERTENSION: Primary | ICD-10-CM

## 2024-07-08 PROCEDURE — 99214 OFFICE O/P EST MOD 30 MIN: CPT | Performed by: INTERNAL MEDICINE

## 2024-07-08 PROCEDURE — 3077F SYST BP >= 140 MM HG: CPT | Performed by: INTERNAL MEDICINE

## 2024-07-08 PROCEDURE — 3079F DIAST BP 80-89 MM HG: CPT | Performed by: INTERNAL MEDICINE

## 2024-07-08 RX ORDER — AMLODIPINE BESYLATE 10 MG/1
10 TABLET ORAL DAILY
Qty: 90 TABLET | Refills: 3 | Status: SHIPPED | OUTPATIENT
Start: 2024-07-08

## 2024-07-08 ASSESSMENT — PATIENT HEALTH QUESTIONNAIRE - PHQ9
SUM OF ALL RESPONSES TO PHQ QUESTIONS 1-9: 0
SUM OF ALL RESPONSES TO PHQ QUESTIONS 1-9: 0
1. LITTLE INTEREST OR PLEASURE IN DOING THINGS: NOT AT ALL
SUM OF ALL RESPONSES TO PHQ QUESTIONS 1-9: 0
SUM OF ALL RESPONSES TO PHQ9 QUESTIONS 1 & 2: 0
SUM OF ALL RESPONSES TO PHQ QUESTIONS 1-9: 0
2. FEELING DOWN, DEPRESSED OR HOPELESS: NOT AT ALL

## 2024-07-08 NOTE — PROGRESS NOTES
Chief Complaint   Patient presents with    Hypertension     Routine follow up       \"Have you been to the ER, urgent care clinic since your last visit?  Hospitalized since your last visit?\"    NO    “Have you seen or consulted any other health care providers outside of [facility] since your last visit?”    NO            Click Here for Release of Records Request    
packs/day: 0.5 packs/day for 25.0 years (12.5 ttl pk-yrs)     Types: Cigarettes     Start date: 10/1/1993     Quit date: 10/1/2018     Years since quittin.7    Smokeless tobacco: Never   Substance and Sexual Activity    Alcohol use: Yes    Drug use: No    Sexual activity: Not on file   Other Topics Concern    Not on file   Social History Narrative    Not on file     Social Determinants of Health     Financial Resource Strain: Low Risk  (2023)    Overall Financial Resource Strain (CARDIA)     Difficulty of Paying Living Expenses: Not hard at all   Food Insecurity: Not on file (2023)   Transportation Needs: Unknown (2023)    PRAPARE - Transportation     Lack of Transportation (Medical): Not on file     Lack of Transportation (Non-Medical): No   Physical Activity: Not on file   Stress: Not on file   Social Connections: Not on file   Intimate Partner Violence: Not on file   Housing Stability: Unknown (2023)    Housing Stability Vital Sign     Unable to Pay for Housing in the Last Year: Not on file     Number of Places Lived in the Last Year: Not on file     Unstable Housing in the Last Year: No          BP (!) 151/89   Pulse 93   Temp 98 °F (36.7 °C) (Temporal)   Resp 14   Ht 1.727 m (5' 8\")   Wt 109 kg (240 lb 6.4 oz)   SpO2 95%   BMI 36.55 kg/m²     Physical Examination:   General - Well appearing male.  overweight  HEENT - PERRL, TM no erythema/opacification, normal nasal turbinates, no oropharyngeal erythema or exudate, MMM  Neck - supple, no bruits, no thyroidomegaly, no lymphadenopathy  Pulm - clear to auscultation bilaterally  Cardio - RRR, normal S1 S2, no murmur  Abd - soft, nontender, no masses, no HSM  Extrem - no edema, +2 distal pulses  Neuro-  No focal deficits, CN intact     Assessment/Plan:     Htn--continue norvasc, hctz.  Asked him to monitor at home, notify me if above 140/85.  Can increase dose of hctz if needed  Elevated lfts--check cmp  Abn tsh--check tsh,

## 2024-07-11 ENCOUNTER — LAB (OUTPATIENT)
Age: 64
End: 2024-07-11

## 2024-07-11 DIAGNOSIS — K74.60 CIRRHOSIS OF LIVER WITHOUT ASCITES, UNSPECIFIED HEPATIC CIRRHOSIS TYPE (HCC): ICD-10-CM

## 2024-07-11 DIAGNOSIS — R79.89 ABNORMAL TSH: ICD-10-CM

## 2024-07-11 DIAGNOSIS — R79.89 ELEVATED LFTS: ICD-10-CM

## 2024-07-11 DIAGNOSIS — I10 ESSENTIAL HYPERTENSION: ICD-10-CM

## 2024-07-12 ENCOUNTER — TELEPHONE (OUTPATIENT)
Age: 64
End: 2024-07-12

## 2024-07-12 LAB
ALBUMIN SERPL-MCNC: 4.3 G/DL (ref 3.9–4.9)
ALP SERPL-CCNC: 90 IU/L (ref 44–121)
ALT SERPL-CCNC: 71 IU/L (ref 0–44)
AST SERPL-CCNC: 70 IU/L (ref 0–40)
BILIRUB SERPL-MCNC: 1 MG/DL (ref 0–1.2)
BUN SERPL-MCNC: 25 MG/DL (ref 8–27)
BUN/CREAT SERPL: 26 (ref 10–24)
CALCIUM SERPL-MCNC: 9.7 MG/DL (ref 8.6–10.2)
CHLORIDE SERPL-SCNC: 101 MMOL/L (ref 96–106)
CO2 SERPL-SCNC: 24 MMOL/L (ref 20–29)
CREAT SERPL-MCNC: 0.97 MG/DL (ref 0.76–1.27)
EGFRCR SERPLBLD CKD-EPI 2021: 88 ML/MIN/1.73
GLOBULIN SER CALC-MCNC: 3.2 G/DL (ref 1.5–4.5)
GLUCOSE SERPL-MCNC: 120 MG/DL (ref 70–99)
HIV 1+2 AB+HIV1 P24 AG SERPL QL IA: NON REACTIVE
POTASSIUM SERPL-SCNC: 5.5 MMOL/L (ref 3.5–5.2)
PROT SERPL-MCNC: 7.5 G/DL (ref 6–8.5)
SODIUM SERPL-SCNC: 140 MMOL/L (ref 134–144)
T4 FREE SERPL-MCNC: 1.06 NG/DL (ref 0.82–1.77)
TSH SERPL DL<=0.005 MIU/L-ACNC: 5.64 UIU/ML (ref 0.45–4.5)

## 2024-07-12 NOTE — TELEPHONE ENCOUNTER
Patient wife  called    Regarding:   Lab Results  States saw numbers on mychart and is concerned sandy about potassium  Request:    Please call to discuss/review  [phone]     
Returned Mariluz's (patient's wife) call in regards to patient's lab results.   I informed Mrs. Pierce that physician has not reviewed patient's lab results yet, once he does he will generate a note and a nurse will contact patient with physician's recommendations.   Mrs. Pierce verbalized understanding.     
denies cough or smoking h/o or night sweat. admit to weight loss and decreased appetite     obtain CT chest and follow up

## 2024-07-20 DIAGNOSIS — Z86.19 HISTORY OF HEPATITIS C: ICD-10-CM

## 2024-07-20 DIAGNOSIS — R79.89 ELEVATED LFTS: Primary | ICD-10-CM

## 2024-12-11 RX ORDER — HYDROCHLOROTHIAZIDE 12.5 MG/1
12.5 CAPSULE ORAL EVERY MORNING
Qty: 90 CAPSULE | Refills: 3 | Status: SHIPPED | OUTPATIENT
Start: 2024-12-11

## 2024-12-12 DIAGNOSIS — I10 ESSENTIAL HYPERTENSION: Primary | ICD-10-CM

## 2024-12-12 RX ORDER — AMLODIPINE BESYLATE 10 MG/1
10 TABLET ORAL DAILY
Qty: 90 TABLET | Refills: 3 | Status: SHIPPED | OUTPATIENT
Start: 2024-12-12

## 2024-12-12 NOTE — TELEPHONE ENCOUNTER
Pt's med got switched and they do not get filled at the same time.     Pt needs refill and pharm has not requested.      hydroCHLOROthiazide 12.5 MG capsule    Refill to [facility] #[phone]      Please call wife to discuss the med problem.

## 2024-12-12 NOTE — TELEPHONE ENCOUNTER
Spoke with patient spouse, Mariluz. Advised HCTZ refilled 12/11/2024. Refill on amlodipine.  Wife verbalized understanding.     PCP: Daniel Garcia III, DO    Last appt: 7/8/2024   No future appointments.    Requested Prescriptions     Pending Prescriptions Disp Refills    amLODIPine (NORVASC) 10 MG tablet 90 tablet 3     Sig: Take 1 tablet by mouth daily